# Patient Record
Sex: FEMALE | Race: WHITE | NOT HISPANIC OR LATINO | Employment: OTHER | URBAN - METROPOLITAN AREA
[De-identification: names, ages, dates, MRNs, and addresses within clinical notes are randomized per-mention and may not be internally consistent; named-entity substitution may affect disease eponyms.]

---

## 2018-05-09 ENCOUNTER — APPOINTMENT (OUTPATIENT)
Dept: RADIOLOGY | Facility: IMAGING CENTER | Age: 78
End: 2018-05-09
Attending: NURSE PRACTITIONER
Payer: MEDICARE

## 2018-05-09 ENCOUNTER — OFFICE VISIT (OUTPATIENT)
Dept: URGENT CARE | Facility: CLINIC | Age: 78
End: 2018-05-09
Payer: MEDICARE

## 2018-05-09 VITALS
HEART RATE: 76 BPM | BODY MASS INDEX: 27.97 KG/M2 | RESPIRATION RATE: 16 BRPM | DIASTOLIC BLOOD PRESSURE: 70 MMHG | TEMPERATURE: 97.6 F | SYSTOLIC BLOOD PRESSURE: 120 MMHG | HEIGHT: 62 IN | WEIGHT: 152 LBS | OXYGEN SATURATION: 97 %

## 2018-05-09 DIAGNOSIS — R05.9 COUGH: ICD-10-CM

## 2018-05-09 DIAGNOSIS — J22 LRTI (LOWER RESPIRATORY TRACT INFECTION): ICD-10-CM

## 2018-05-09 PROCEDURE — 99214 OFFICE O/P EST MOD 30 MIN: CPT | Performed by: NURSE PRACTITIONER

## 2018-05-09 PROCEDURE — 71046 X-RAY EXAM CHEST 2 VIEWS: CPT | Mod: TC,FY | Performed by: NURSE PRACTITIONER

## 2018-05-09 RX ORDER — ALBUTEROL SULFATE 90 UG/1
2 AEROSOL, METERED RESPIRATORY (INHALATION) EVERY 6 HOURS PRN
Qty: 1 INHALER | Refills: 0 | Status: SHIPPED | OUTPATIENT
Start: 2018-05-09 | End: 2020-06-24

## 2018-05-09 RX ORDER — DOXYCYCLINE HYCLATE 100 MG
100 TABLET ORAL 2 TIMES DAILY
Qty: 14 TAB | Refills: 0 | Status: SHIPPED | OUTPATIENT
Start: 2018-05-09 | End: 2018-05-16

## 2018-05-09 RX ORDER — PREDNISONE 10 MG/1
40 TABLET ORAL DAILY
Qty: 20 TAB | Refills: 0 | Status: SHIPPED | OUTPATIENT
Start: 2018-05-09 | End: 2018-05-14

## 2018-05-09 ASSESSMENT — ENCOUNTER SYMPTOMS
SHORTNESS OF BREATH: 1
SORE THROAT: 0
MYALGIAS: 1
COUGH: 1
FEVER: 1
VOMITING: 0
SPUTUM PRODUCTION: 1
WHEEZING: 0
NAUSEA: 0
EYE PAIN: 0
DIZZINESS: 0
CHILLS: 1

## 2018-05-09 NOTE — PROGRESS NOTES
Subjective:     Tayla Molina is a 78 y.o. female who presents for Cough (Over 3 wks stuffy nose , couple days dry cough and chest congestion .)  Patient presents to clinic today with complaints of a productive cough, malaise, body aches, fever ×3 weeks. Patient states she is feeling short of breath. Patient feels symptoms are worsening and not  improving. Patient does have a history of pneumonia.     Cough   This is a new problem. Episode onset: 3 weeks. The problem has been unchanged. The problem occurs constantly. The cough is productive of sputum. Associated symptoms include chills, a fever, myalgias and shortness of breath. Pertinent negatives include no chest pain, nasal congestion, postnasal drip, rash, sore throat or wheezing. Nothing aggravates the symptoms. She has tried OTC cough suppressant for the symptoms. The treatment provided no relief. Her past medical history is significant for pneumonia.     Past Medical History:   Diagnosis Date   • Hypothyroidism    No past surgical history on file.  Social History     Social History   • Marital status:      Spouse name: N/A   • Number of children: N/A   • Years of education: N/A     Occupational History   • Not on file.     Social History Main Topics   • Smoking status: Never Smoker   • Smokeless tobacco: Never Used   • Alcohol use Not on file   • Drug use: Unknown   • Sexual activity: Not on file     Other Topics Concern   • Not on file     Social History Narrative   • No narrative on file    No family history on file. Review of Systems   Constitutional: Positive for chills, fever and malaise/fatigue.   HENT: Negative for postnasal drip and sore throat.    Eyes: Negative for pain.   Respiratory: Positive for cough, sputum production and shortness of breath. Negative for wheezing.    Cardiovascular: Negative for chest pain.   Gastrointestinal: Negative for nausea and vomiting.   Genitourinary: Negative for hematuria.   Musculoskeletal: Positive for  "myalgias.   Skin: Negative for rash.   Neurological: Negative for dizziness.     Allergies   Allergen Reactions   • Codeine Vomiting     Itching      Objective:   /70   Pulse 76   Temp 36.4 °C (97.6 °F)   Resp 16   Ht 1.575 m (5' 2\")   Wt 68.9 kg (152 lb)   SpO2 97%   BMI 27.80 kg/m²   Physical Exam   Constitutional: She is oriented to person, place, and time. She appears well-developed and well-nourished. No distress.   HENT:   Head: Normocephalic and atraumatic.   Right Ear: Tympanic membrane normal.   Left Ear: Tympanic membrane normal.   Nose: Nose normal. Right sinus exhibits no maxillary sinus tenderness and no frontal sinus tenderness. Left sinus exhibits no maxillary sinus tenderness and no frontal sinus tenderness.   Mouth/Throat: Uvula is midline, oropharynx is clear and moist and mucous membranes are normal. No posterior oropharyngeal edema, posterior oropharyngeal erythema or tonsillar abscesses. No tonsillar exudate.   Eyes: Conjunctivae and EOM are normal. Pupils are equal, round, and reactive to light. Right eye exhibits no discharge. Left eye exhibits no discharge.   Cardiovascular: Normal rate and regular rhythm.    No murmur heard.  Pulmonary/Chest: Effort normal. No respiratory distress. She has decreased breath sounds. She has wheezes. She has no rhonchi. She has no rales.   Abdominal: Soft. She exhibits no distension. There is no tenderness.   Neurological: She is alert and oriented to person, place, and time. She has normal reflexes. No sensory deficit.   Skin: Skin is warm, dry and intact.   Psychiatric: She has a normal mood and affect.         Assessment/Plan:   Assessment    1. Cough  DX-CHEST-2 VIEWS    doxycycline (VIBRAMYCIN) 100 MG Tab    predniSONE (DELTASONE) 10 MG Tab    albuterol 108 (90 Base) MCG/ACT Aero Soln inhalation aerosol   2. LRTI (lower respiratory tract infection)  doxycycline (VIBRAMYCIN) 100 MG Tab    predniSONE (DELTASONE) 10 MG Tab    albuterol 108 (90 " Base) MCG/ACT Aero Soln inhalation aerosol     Xray results  HEART: Not enlarged. There is atherosclerotic calcification in the aortic arch.  LUNGS: No areas of air space disease are demonstrated.  PLEURA: No effusion or pneumothorax.  There are surgical clips projecting over the gastroesophageal junction.    Patient with a productive cough, fever ×3 weeks will treat for suspected bacterial infection. Advised to continue supportive care with Tylenol and/or ibuprofen for fevers and discomfort. Increased fluids and electrolytes.      Patient given precautionary s/sx that mandate immediate follow up and evaluation in the ED. Advised of risks of not doing so.    DDX, Supportive care, and indications for immediate follow-up discussed with patient.    Instructed to return to clinic or nearest emergency department if we are not available for any change in condition, further concerns, or worsening of symptoms.    The patient demonstrated a good understanding and agreed with the treatment plan.

## 2019-10-18 ENCOUNTER — OFFICE VISIT (OUTPATIENT)
Dept: URGENT CARE | Facility: CLINIC | Age: 79
End: 2019-10-18
Payer: MEDICARE

## 2019-10-18 VITALS
OXYGEN SATURATION: 97 % | BODY MASS INDEX: 29.45 KG/M2 | TEMPERATURE: 98.2 F | RESPIRATION RATE: 20 BRPM | DIASTOLIC BLOOD PRESSURE: 60 MMHG | WEIGHT: 156 LBS | HEIGHT: 61 IN | HEART RATE: 76 BPM | SYSTOLIC BLOOD PRESSURE: 116 MMHG

## 2019-10-18 DIAGNOSIS — L20.9 ATOPIC DERMATITIS, UNSPECIFIED TYPE: ICD-10-CM

## 2019-10-18 PROCEDURE — 99214 OFFICE O/P EST MOD 30 MIN: CPT | Performed by: PHYSICIAN ASSISTANT

## 2019-10-18 RX ORDER — ATORVASTATIN CALCIUM 20 MG/1
20 TABLET, FILM COATED ORAL DAILY
COMMUNITY
Start: 2019-09-07 | End: 2020-07-13

## 2019-10-18 RX ORDER — CLOPIDOGREL BISULFATE 75 MG/1
75 TABLET ORAL DAILY
COMMUNITY
Start: 2019-10-07 | End: 2020-07-13

## 2019-10-18 RX ORDER — PHENOL 1.4 %
AEROSOL, SPRAY (ML) MUCOUS MEMBRANE
COMMUNITY
End: 2020-06-24

## 2019-10-18 RX ORDER — ASPIRIN 81 MG/1
81 TABLET, CHEWABLE ORAL DAILY
COMMUNITY
End: 2020-06-24

## 2019-10-18 RX ORDER — LEFLUNOMIDE 20 MG/1
20 TABLET ORAL
Refills: 2 | COMMUNITY
Start: 2019-08-27 | End: 2020-02-20

## 2019-10-18 RX ORDER — TRIAMCINOLONE ACETONIDE 5 MG/G
CREAM TOPICAL
Qty: 15 G | Refills: 0 | Status: SHIPPED | OUTPATIENT
Start: 2019-10-18 | End: 2020-06-24

## 2019-10-18 RX ORDER — FAMCICLOVIR 250 MG/1
250 TABLET ORAL 2 TIMES DAILY
COMMUNITY
End: 2020-06-24

## 2019-10-18 ASSESSMENT — ENCOUNTER SYMPTOMS
FEVER: 0
CHILLS: 0

## 2019-10-18 ASSESSMENT — PATIENT HEALTH QUESTIONNAIRE - PHQ9: CLINICAL INTERPRETATION OF PHQ2 SCORE: 0

## 2019-10-18 NOTE — PROGRESS NOTES
"Subjective:   Tayla Molina is a 79 y.o. female who presents for Rash (under bilateral armpits, on her back, burning, itchy, patient believes it's shingles due to leflunomide x 2 weeks )    This is a new problem.  Patient presents to urgent care with concern for possible shingles.  Patient reports itchy rash to the center of her back present for the past 2 weeks.  Today, while dressing, she noted bilateral axilla appeared red as well.  Patient is concerned because she has recently started leflunomide along with her Xeljanz for rheumatoid arthritis.  She is aware that this medication has a potential side effect of immune suppression and shingles and is concerned.  Patient denies any fever or chills.  Denies any new lotions, creams, soaps or detergents.        Rash   Pertinent negatives include no fever.     Review of Systems   Constitutional: Negative for chills, fever and malaise/fatigue.   Skin: Positive for itching and rash.   All other systems reviewed and are negative.    Allergies   Allergen Reactions   • Codeine Vomiting     Itching        Objective:   /60 (BP Location: Right arm, Patient Position: Sitting, BP Cuff Size: Adult)   Pulse 76   Temp 36.8 °C (98.2 °F) (Temporal)   Resp 20   Ht 1.549 m (5' 1\")   Wt 70.8 kg (156 lb)   SpO2 97%   BMI 29.48 kg/m²   Physical Exam   Constitutional: She is oriented to person, place, and time. She appears well-developed and well-nourished. She does not appear ill. No distress.   HENT:   Head: Normocephalic and atraumatic.   Right Ear: External ear normal.   Left Ear: External ear normal.   Nose: Nose normal.   Mouth/Throat: Uvula is midline, oropharynx is clear and moist and mucous membranes are normal. No oropharyngeal exudate.   Eyes: Pupils are equal, round, and reactive to light. Conjunctivae and EOM are normal.   Neck: Normal range of motion. Neck supple.   Cardiovascular: Normal rate, regular rhythm and normal heart sounds. Exam reveals no gallop and " no friction rub.   No murmur heard.  Pulmonary/Chest: Effort normal and breath sounds normal. No respiratory distress. She has no wheezes. She has no rales.   Abdominal: Soft. Bowel sounds are normal. She exhibits no distension and no mass. There is no tenderness. There is no rebound and no guarding.   Musculoskeletal: Normal range of motion. She exhibits no edema, tenderness or deformity.   Lymphadenopathy:        Head (right side): No submental, no submandibular and no tonsillar adenopathy present.        Head (left side): No submental, no submandibular and no tonsillar adenopathy present.     She has no cervical adenopathy.        Right: No supraclavicular adenopathy present.        Left: No supraclavicular adenopathy present.   Neurological: She is alert and oriented to person, place, and time. She has normal strength. No cranial nerve deficit or sensory deficit. Coordination normal.   Skin: Skin is warm and dry. Rash noted. Rash is maculopapular.        Center of back along thoracic spine with 3 or 4 small patches of dry scaly skin on mildly erythematous base.  No vesicular lesions noted.   Psychiatric: She has a normal mood and affect. Judgment normal.   Vitals reviewed.          Assessment/Plan:   Assessment    1. Atopic dermatitis, unspecified type  - triamcinolone acetonide (KENALOG) 0.5 % Cream; Apply small amount to affected area tid PRN itching  Dispense: 15 g; Refill: 0    Reassurance provided.  Patient is given a prescription for triamcinolone to use as needed.  Recommend moisturizer.  Observe closely.    Differential diagnosis, natural history, supportive care, and indications for immediate follow-up discussed.    Red flag warning symptoms and strict ER/follow-up precautions given.  The patient demonstrated a good understanding and agreed with the treatment plan.  Please note that this note was created using voice recognition speech to text software. Every effort has been made to correct obvious  errors.  However, I expect there are errors of grammar and possibly context that were not discovered prior to finalizing the note  SJanie Jaimes PA-C

## 2020-02-20 ENCOUNTER — OFFICE VISIT (OUTPATIENT)
Dept: URGENT CARE | Facility: CLINIC | Age: 80
End: 2020-02-20
Payer: MEDICARE

## 2020-02-20 ENCOUNTER — APPOINTMENT (OUTPATIENT)
Dept: RADIOLOGY | Facility: IMAGING CENTER | Age: 80
End: 2020-02-20
Attending: NURSE PRACTITIONER
Payer: MEDICARE

## 2020-02-20 VITALS
OXYGEN SATURATION: 95 % | TEMPERATURE: 99 F | HEIGHT: 61 IN | WEIGHT: 150 LBS | SYSTOLIC BLOOD PRESSURE: 116 MMHG | BODY MASS INDEX: 28.32 KG/M2 | DIASTOLIC BLOOD PRESSURE: 54 MMHG | RESPIRATION RATE: 18 BRPM | HEART RATE: 82 BPM

## 2020-02-20 DIAGNOSIS — Z87.01 HISTORY OF PNEUMONIA: ICD-10-CM

## 2020-02-20 DIAGNOSIS — R06.2 WHEEZING: ICD-10-CM

## 2020-02-20 DIAGNOSIS — J22 LOWER RESPIRATORY INFECTION: ICD-10-CM

## 2020-02-20 DIAGNOSIS — R42 DIZZINESS: ICD-10-CM

## 2020-02-20 LAB — EKG 4674: NORMAL

## 2020-02-20 PROCEDURE — 94640 AIRWAY INHALATION TREATMENT: CPT | Performed by: NURSE PRACTITIONER

## 2020-02-20 PROCEDURE — 99214 OFFICE O/P EST MOD 30 MIN: CPT | Mod: 25 | Performed by: NURSE PRACTITIONER

## 2020-02-20 PROCEDURE — 71046 X-RAY EXAM CHEST 2 VIEWS: CPT | Mod: TC,FY | Performed by: NURSE PRACTITIONER

## 2020-02-20 RX ORDER — BENZONATATE 100 MG/1
100 CAPSULE ORAL 3 TIMES DAILY PRN
Qty: 30 CAP | Refills: 0 | Status: SHIPPED | OUTPATIENT
Start: 2020-02-20 | End: 2020-06-24

## 2020-02-20 RX ORDER — ALBUTEROL SULFATE 90 UG/1
1-2 AEROSOL, METERED RESPIRATORY (INHALATION) EVERY 4 HOURS PRN
Qty: 1 INHALER | Refills: 0 | Status: SHIPPED | OUTPATIENT
Start: 2020-02-20 | End: 2020-06-24

## 2020-02-20 RX ORDER — IPRATROPIUM BROMIDE AND ALBUTEROL SULFATE 2.5; .5 MG/3ML; MG/3ML
3 SOLUTION RESPIRATORY (INHALATION) ONCE
Status: COMPLETED | OUTPATIENT
Start: 2020-02-20 | End: 2020-02-20

## 2020-02-20 RX ORDER — DOXYCYCLINE HYCLATE 100 MG
100 TABLET ORAL 2 TIMES DAILY
Qty: 14 TAB | Refills: 0 | Status: SHIPPED | OUTPATIENT
Start: 2020-02-20 | End: 2020-02-27

## 2020-02-20 RX ADMIN — IPRATROPIUM BROMIDE AND ALBUTEROL SULFATE 3 ML: 2.5; .5 SOLUTION RESPIRATORY (INHALATION) at 11:40

## 2020-02-20 ASSESSMENT — ENCOUNTER SYMPTOMS
ABDOMINAL PAIN: 0
EYE REDNESS: 0
MYALGIAS: 1
SHORTNESS OF BREATH: 1
COUGH: 1
PALPITATIONS: 0
WHEEZING: 0
NAUSEA: 0
HEADACHES: 0
CHILLS: 1
VOMITING: 0
EYE DISCHARGE: 0
FEVER: 0
DIARRHEA: 0
SPUTUM PRODUCTION: 1
STRIDOR: 0
SORE THROAT: 1
DIZZINESS: 1

## 2020-02-20 NOTE — PROGRESS NOTES
"Subjective:   Tayla Molina is a 80 y.o. female who presents for Cough (x5 days, productive); Sore Throat; Body Aches; and Shortness of Breath        Cough   This is a new problem. The current episode started in the past 7 days. The problem has been gradually worsening. The problem occurs every few minutes. The cough is productive of sputum. Associated symptoms include chills, myalgias, nasal congestion, postnasal drip, a sore throat and shortness of breath. Pertinent negatives include no chest pain, ear pain, eye redness, fever (subjective), headaches, rash or wheezing. Treatments tried: NyQuil. The treatment provided mild relief. Her past medical history is significant for asthma and pneumonia (Multiple times).      Accompanied by daughter    Review of Systems   Constitutional: Positive for chills. Negative for fever (subjective).   HENT: Positive for congestion, postnasal drip and sore throat. Negative for ear discharge and ear pain.    Eyes: Negative for discharge and redness.   Respiratory: Positive for cough, sputum production and shortness of breath. Negative for wheezing and stridor.    Cardiovascular: Negative for chest pain and palpitations.   Gastrointestinal: Negative for abdominal pain, diarrhea, nausea and vomiting.   Musculoskeletal: Positive for myalgias.   Skin: Negative for itching and rash.   Neurological: Positive for dizziness. Negative for headaches.   All other systems reviewed and are negative.    PMH:  has a past medical history of Hypothyroidism.  ALLERGIES:   Allergies   Allergen Reactions   • Codeine Vomiting     Itching       Patient's PMH, SocHx, SurgHx, FamHx, Drug allergies and medications reviewed.     Objective:   /54 (BP Location: Left arm, Patient Position: Sitting, BP Cuff Size: Adult)   Pulse 82   Temp 37.2 °C (99 °F) (Temporal)   Resp 18   Ht 1.549 m (5' 1\")   Wt 68 kg (150 lb)   SpO2 95%   BMI 28.34 kg/m²   Physical Exam  Vitals signs reviewed.   Constitutional: "       Appearance: She is well-developed.   HENT:      Head: Normocephalic.      Right Ear: Tympanic membrane and ear canal normal. No middle ear effusion. Tympanic membrane is not perforated or erythematous.      Left Ear: Tympanic membrane and ear canal normal.  No middle ear effusion. Tympanic membrane is not perforated or erythematous.      Nose: Nose normal. No rhinorrhea.      Right Sinus: No maxillary sinus tenderness or frontal sinus tenderness.      Left Sinus: No maxillary sinus tenderness or frontal sinus tenderness.      Mouth/Throat:      Lips: Pink.      Mouth: Mucous membranes are moist.      Pharynx: Oropharynx is clear. Uvula midline. No oropharyngeal exudate, posterior oropharyngeal erythema or uvula swelling.      Tonsils: No tonsillar exudate.   Eyes:      General: Lids are normal.      Extraocular Movements: Extraocular movements intact.      Conjunctiva/sclera: Conjunctivae normal.      Pupils: Pupils are equal, round, and reactive to light.   Neck:      Musculoskeletal: Normal range of motion.      Thyroid: No thyromegaly.   Cardiovascular:      Rate and Rhythm: Normal rate and regular rhythm.      Heart sounds: Normal heart sounds.   Pulmonary:      Effort: Pulmonary effort is normal. No tachypnea, bradypnea, accessory muscle usage, prolonged expiration or respiratory distress.      Breath sounds: Examination of the right-upper field reveals wheezing. Examination of the right-lower field reveals wheezing. Wheezing present.   Lymphadenopathy:      Head:      Right side of head: No submandibular or tonsillar adenopathy.      Left side of head: No submandibular or tonsillar adenopathy.   Skin:     General: Skin is warm and dry.      Findings: No rash.   Neurological:      Mental Status: She is alert and oriented to person, place, and time.   Psychiatric:         Mood and Affect: Mood normal.         Speech: Speech normal.         Behavior: Behavior normal. Behavior is cooperative.         Thought  Content: Thought content normal.         Judgment: Judgment normal.           Assessment/Plan:   Assessment    1. Dizziness  EKG   2. Wheezing  DX-CHEST-2 VIEWS    ipratropium-albuterol (DUONEB) nebulizer solution    albuterol 108 (90 Base) MCG/ACT Aero Soln inhalation aerosol   3. History of pneumonia  DX-CHEST-2 VIEWS   4. Lower respiratory infection  doxycycline (VIBRAMYCIN) 100 MG Tab    benzonatate (TESSALON) 100 MG Cap     EKG to rule out acute cardiac issues. EKG NSR without any acute changes to ST segment or T wave changes.  Chest xray negative  Recommended OTC Coricidin  Patient encouraged to increase clear liquid intake  Discussed signs/symptoms of worsening infection and when to return to the office.    Differential diagnosis, natural history, supportive care, and indications for immediate follow-up discussed.     **Please note that all invasive procedures during this visit were performed by myself and/or the Medical Assistant under the supervision of the PA or MD in office**

## 2020-06-24 ENCOUNTER — HOSPITAL ENCOUNTER (OUTPATIENT)
Facility: MEDICAL CENTER | Age: 80
End: 2020-06-25
Attending: EMERGENCY MEDICINE | Admitting: HOSPITALIST
Payer: MEDICARE

## 2020-06-24 ENCOUNTER — APPOINTMENT (OUTPATIENT)
Dept: RADIOLOGY | Facility: MEDICAL CENTER | Age: 80
End: 2020-06-24
Attending: EMERGENCY MEDICINE
Payer: MEDICARE

## 2020-06-24 DIAGNOSIS — I63.9 CEREBROVASCULAR ACCIDENT (CVA), UNSPECIFIED MECHANISM (HCC): ICD-10-CM

## 2020-06-24 DIAGNOSIS — R07.9 CHEST PAIN, UNSPECIFIED TYPE: ICD-10-CM

## 2020-06-24 PROBLEM — E03.9 HYPOTHYROID: Status: ACTIVE | Noted: 2020-06-24

## 2020-06-24 LAB
ABO + RH BLD: NORMAL
ABO GROUP BLD: NORMAL
ALBUMIN SERPL BCP-MCNC: 4 G/DL (ref 3.2–4.9)
ALBUMIN/GLOB SERPL: 1.7 G/DL
ALP SERPL-CCNC: 58 U/L (ref 30–99)
ALT SERPL-CCNC: 22 U/L (ref 2–50)
ANION GAP SERPL CALC-SCNC: 9 MMOL/L (ref 7–16)
APTT PPP: 29 SEC (ref 24.7–36)
AST SERPL-CCNC: 27 U/L (ref 12–45)
BASOPHILS # BLD AUTO: 0.3 % (ref 0–1.8)
BASOPHILS # BLD: 0.02 K/UL (ref 0–0.12)
BILIRUB SERPL-MCNC: 0.2 MG/DL (ref 0.1–1.5)
BLD GP AB SCN SERPL QL: NORMAL
BUN SERPL-MCNC: 16 MG/DL (ref 8–22)
CALCIUM SERPL-MCNC: 9.3 MG/DL (ref 8.4–10.2)
CFT BLD TEG: 3.5 MIN (ref 5–10)
CHLORIDE SERPL-SCNC: 108 MMOL/L (ref 96–112)
CLOT ANGLE BLD TEG: 70.9 DEGREES (ref 53–72)
CLOT LYSIS 30M P MA LENFR BLD TEG: 0 % (ref 0–8)
CO2 SERPL-SCNC: 23 MMOL/L (ref 20–33)
CREAT SERPL-MCNC: 0.8 MG/DL (ref 0.5–1.4)
CT.EXTRINSIC BLD ROTEM: 1.4 MIN (ref 1–3)
EKG IMPRESSION: NORMAL
EOSINOPHIL # BLD AUTO: 0.1 K/UL (ref 0–0.51)
EOSINOPHIL NFR BLD: 1.6 % (ref 0–6.9)
ERYTHROCYTE [DISTWIDTH] IN BLOOD BY AUTOMATED COUNT: 46.1 FL (ref 35.9–50)
EST. AVERAGE GLUCOSE BLD GHB EST-MCNC: 97 MG/DL
GLOBULIN SER CALC-MCNC: 2.4 G/DL (ref 1.9–3.5)
GLUCOSE BLD-MCNC: 95 MG/DL (ref 65–99)
GLUCOSE SERPL-MCNC: 91 MG/DL (ref 65–99)
HBA1C MFR BLD: 5 % (ref 0–5.6)
HCT VFR BLD AUTO: 41.1 % (ref 37–47)
HGB BLD-MCNC: 13.4 G/DL (ref 12–16)
IMM GRANULOCYTES # BLD AUTO: 0.02 K/UL (ref 0–0.11)
IMM GRANULOCYTES NFR BLD AUTO: 0.3 % (ref 0–0.9)
INR PPP: 0.91 (ref 0.87–1.13)
LYMPHOCYTES # BLD AUTO: 1.51 K/UL (ref 1–4.8)
LYMPHOCYTES NFR BLD: 24.1 % (ref 22–41)
MCF BLD TEG: 68.1 MM (ref 50–70)
MCH RBC QN AUTO: 32.2 PG (ref 27–33)
MCHC RBC AUTO-ENTMCNC: 32.6 G/DL (ref 33.6–35)
MCV RBC AUTO: 98.8 FL (ref 81.4–97.8)
MONOCYTES # BLD AUTO: 0.46 K/UL (ref 0–0.85)
MONOCYTES NFR BLD AUTO: 7.3 % (ref 0–13.4)
NEUTROPHILS # BLD AUTO: 4.15 K/UL (ref 2–7.15)
NEUTROPHILS NFR BLD: 66.4 % (ref 44–72)
NRBC # BLD AUTO: 0 K/UL
NRBC BLD-RTO: 0 /100 WBC
PLATELET # BLD AUTO: 238 K/UL (ref 164–446)
PMV BLD AUTO: 9.8 FL (ref 9–12.9)
POTASSIUM SERPL-SCNC: 3.7 MMOL/L (ref 3.6–5.5)
PROT SERPL-MCNC: 6.4 G/DL (ref 6–8.2)
PROTHROMBIN TIME: 12.3 SEC (ref 12–14.6)
RBC # BLD AUTO: 4.16 M/UL (ref 4.2–5.4)
RH BLD: NORMAL
SODIUM SERPL-SCNC: 140 MMOL/L (ref 135–145)
TEG ALGORITHM TGALG: ABNORMAL
TROPONIN T SERPL-MCNC: <6 NG/L (ref 6–19)
WBC # BLD AUTO: 6.3 K/UL (ref 4.8–10.8)

## 2020-06-24 PROCEDURE — 71045 X-RAY EXAM CHEST 1 VIEW: CPT

## 2020-06-24 PROCEDURE — 86901 BLOOD TYPING SEROLOGIC RH(D): CPT

## 2020-06-24 PROCEDURE — 700102 HCHG RX REV CODE 250 W/ 637 OVERRIDE(OP): Performed by: HOSPITALIST

## 2020-06-24 PROCEDURE — 86850 RBC ANTIBODY SCREEN: CPT

## 2020-06-24 PROCEDURE — G0378 HOSPITAL OBSERVATION PER HR: HCPCS

## 2020-06-24 PROCEDURE — 36415 COLL VENOUS BLD VENIPUNCTURE: CPT

## 2020-06-24 PROCEDURE — 84484 ASSAY OF TROPONIN QUANT: CPT

## 2020-06-24 PROCEDURE — 85384 FIBRINOGEN ACTIVITY: CPT

## 2020-06-24 PROCEDURE — 0042T CT-CEREBRAL PERFUSION ANALYSIS: CPT

## 2020-06-24 PROCEDURE — 700117 HCHG RX CONTRAST REV CODE 255: Performed by: EMERGENCY MEDICINE

## 2020-06-24 PROCEDURE — 99285 EMERGENCY DEPT VISIT HI MDM: CPT

## 2020-06-24 PROCEDURE — A9270 NON-COVERED ITEM OR SERVICE: HCPCS | Performed by: HOSPITALIST

## 2020-06-24 PROCEDURE — 86900 BLOOD TYPING SEROLOGIC ABO: CPT

## 2020-06-24 PROCEDURE — 82962 GLUCOSE BLOOD TEST: CPT

## 2020-06-24 PROCEDURE — 83036 HEMOGLOBIN GLYCOSYLATED A1C: CPT

## 2020-06-24 PROCEDURE — 70496 CT ANGIOGRAPHY HEAD: CPT

## 2020-06-24 PROCEDURE — 85025 COMPLETE CBC W/AUTO DIFF WBC: CPT

## 2020-06-24 PROCEDURE — 93005 ELECTROCARDIOGRAM TRACING: CPT | Performed by: EMERGENCY MEDICINE

## 2020-06-24 PROCEDURE — 70450 CT HEAD/BRAIN W/O DYE: CPT

## 2020-06-24 PROCEDURE — 99220 PR INITIAL OBSERVATION CARE,LEVL III: CPT | Performed by: HOSPITALIST

## 2020-06-24 PROCEDURE — 80053 COMPREHEN METABOLIC PANEL: CPT

## 2020-06-24 PROCEDURE — 85576 BLOOD PLATELET AGGREGATION: CPT

## 2020-06-24 PROCEDURE — 85730 THROMBOPLASTIN TIME PARTIAL: CPT

## 2020-06-24 PROCEDURE — 70498 CT ANGIOGRAPHY NECK: CPT

## 2020-06-24 PROCEDURE — 85610 PROTHROMBIN TIME: CPT

## 2020-06-24 PROCEDURE — 85347 COAGULATION TIME ACTIVATED: CPT

## 2020-06-24 RX ORDER — BISACODYL 10 MG
10 SUPPOSITORY, RECTAL RECTAL
Status: CANCELLED | OUTPATIENT
Start: 2020-06-24

## 2020-06-24 RX ORDER — CLOPIDOGREL BISULFATE 75 MG/1
75 TABLET ORAL DAILY
Status: DISCONTINUED | OUTPATIENT
Start: 2020-06-24 | End: 2020-06-25 | Stop reason: HOSPADM

## 2020-06-24 RX ORDER — BISACODYL 10 MG
10 SUPPOSITORY, RECTAL RECTAL
Status: DISCONTINUED | OUTPATIENT
Start: 2020-06-24 | End: 2020-06-25 | Stop reason: HOSPADM

## 2020-06-24 RX ORDER — LEVOTHYROXINE SODIUM 0.05 MG/1
100 TABLET ORAL
Status: DISCONTINUED | OUTPATIENT
Start: 2020-06-25 | End: 2020-06-25 | Stop reason: HOSPADM

## 2020-06-24 RX ORDER — POLYETHYLENE GLYCOL 3350 17 G/17G
1 POWDER, FOR SOLUTION ORAL
Status: DISCONTINUED | OUTPATIENT
Start: 2020-06-24 | End: 2020-06-25 | Stop reason: HOSPADM

## 2020-06-24 RX ORDER — ACETAMINOPHEN 325 MG/1
650 TABLET ORAL EVERY 6 HOURS PRN
Status: DISCONTINUED | OUTPATIENT
Start: 2020-06-24 | End: 2020-06-25 | Stop reason: HOSPADM

## 2020-06-24 RX ORDER — POLYETHYLENE GLYCOL 3350 17 G/17G
1 POWDER, FOR SOLUTION ORAL
Status: CANCELLED | OUTPATIENT
Start: 2020-06-24

## 2020-06-24 RX ORDER — ACETAMINOPHEN 325 MG/1
650 TABLET ORAL EVERY 6 HOURS PRN
Status: CANCELLED | OUTPATIENT
Start: 2020-06-24

## 2020-06-24 RX ORDER — TOPIRAMATE 25 MG/1
50 TABLET ORAL 2 TIMES DAILY
Status: DISCONTINUED | OUTPATIENT
Start: 2020-06-24 | End: 2020-06-25 | Stop reason: HOSPADM

## 2020-06-24 RX ORDER — AMOXICILLIN 250 MG
2 CAPSULE ORAL 2 TIMES DAILY
Status: CANCELLED | OUTPATIENT
Start: 2020-06-24

## 2020-06-24 RX ORDER — TOPIRAMATE 25 MG/1
50 TABLET ORAL 2 TIMES DAILY
Status: CANCELLED | OUTPATIENT
Start: 2020-06-25

## 2020-06-24 RX ORDER — ATORVASTATIN CALCIUM 40 MG/1
40 TABLET, FILM COATED ORAL DAILY
Status: DISCONTINUED | OUTPATIENT
Start: 2020-06-24 | End: 2020-06-25 | Stop reason: HOSPADM

## 2020-06-24 RX ORDER — CLOPIDOGREL BISULFATE 75 MG/1
75 TABLET ORAL DAILY
Status: CANCELLED | OUTPATIENT
Start: 2020-06-25

## 2020-06-24 RX ORDER — TRAZODONE HYDROCHLORIDE 50 MG/1
50 TABLET ORAL NIGHTLY
Status: DISCONTINUED | OUTPATIENT
Start: 2020-06-24 | End: 2020-06-25 | Stop reason: HOSPADM

## 2020-06-24 RX ORDER — TRAZODONE HYDROCHLORIDE 50 MG/1
50 TABLET ORAL NIGHTLY
Status: CANCELLED | OUTPATIENT
Start: 2020-06-24

## 2020-06-24 RX ORDER — ATORVASTATIN CALCIUM 40 MG/1
40 TABLET, FILM COATED ORAL DAILY
Status: CANCELLED | OUTPATIENT
Start: 2020-06-25

## 2020-06-24 RX ORDER — AMOXICILLIN 250 MG
2 CAPSULE ORAL 2 TIMES DAILY
Status: DISCONTINUED | OUTPATIENT
Start: 2020-06-24 | End: 2020-06-25 | Stop reason: HOSPADM

## 2020-06-24 RX ORDER — LEVOTHYROXINE SODIUM 0.05 MG/1
100 TABLET ORAL
Status: CANCELLED | OUTPATIENT
Start: 2020-06-25

## 2020-06-24 RX ORDER — CLOPIDOGREL BISULFATE 75 MG/1
75 TABLET ORAL DAILY
Status: DISCONTINUED | OUTPATIENT
Start: 2020-06-24 | End: 2020-06-24

## 2020-06-24 RX ADMIN — IOHEXOL 40 ML: 350 INJECTION, SOLUTION INTRAVENOUS at 13:37

## 2020-06-24 RX ADMIN — ACETAMINOPHEN 650 MG: 325 TABLET, FILM COATED ORAL at 20:03

## 2020-06-24 RX ADMIN — TRAZODONE HYDROCHLORIDE 50 MG: 50 TABLET ORAL at 22:02

## 2020-06-24 RX ADMIN — IOHEXOL 100 ML: 350 INJECTION, SOLUTION INTRAVENOUS at 13:36

## 2020-06-24 RX ADMIN — TOPIRAMATE 50 MG: 25 TABLET, FILM COATED ORAL at 19:56

## 2020-06-24 ASSESSMENT — ENCOUNTER SYMPTOMS
EYE PAIN: 0
SPEECH CHANGE: 0
HEMOPTYSIS: 0
BLOOD IN STOOL: 0
FOCAL WEAKNESS: 1
LOSS OF CONSCIOUSNESS: 0
MYALGIAS: 0
SPUTUM PRODUCTION: 0
EYE DISCHARGE: 0
SENSORY CHANGE: 1
SORE THROAT: 0
HALLUCINATIONS: 0
BRUISES/BLEEDS EASILY: 0
NERVOUS/ANXIOUS: 0
ABDOMINAL PAIN: 0
PALPITATIONS: 0
FLANK PAIN: 0
EYE REDNESS: 0
SHORTNESS OF BREATH: 0
FEVER: 0
STRIDOR: 0
SEIZURES: 0
CHILLS: 0
COUGH: 0
DIARRHEA: 0
VOMITING: 0

## 2020-06-24 ASSESSMENT — LIFESTYLE VARIABLES
EVER FELT BAD OR GUILTY ABOUT YOUR DRINKING: NO
ALCOHOL_USE: NO
EVER HAD A DRINK FIRST THING IN THE MORNING TO STEADY YOUR NERVES TO GET RID OF A HANGOVER: NO
HOW MANY TIMES IN THE PAST YEAR HAVE YOU HAD 5 OR MORE DRINKS IN A DAY: 0
HAVE PEOPLE ANNOYED YOU BY CRITICIZING YOUR DRINKING: NO
TOTAL SCORE: 0
TOTAL SCORE: 0
CONSUMPTION TOTAL: NEGATIVE
EVER_SMOKED: YES
HAVE YOU EVER FELT YOU SHOULD CUT DOWN ON YOUR DRINKING: NO
TOTAL SCORE: 0
ON A TYPICAL DAY WHEN YOU DRINK ALCOHOL HOW MANY DRINKS DO YOU HAVE: 0
AVERAGE NUMBER OF DAYS PER WEEK YOU HAVE A DRINK CONTAINING ALCOHOL: 0

## 2020-06-24 ASSESSMENT — COGNITIVE AND FUNCTIONAL STATUS - GENERAL
MOBILITY SCORE: 22
WALKING IN HOSPITAL ROOM: A LITTLE
DAILY ACTIVITIY SCORE: 24
SUGGESTED CMS G CODE MODIFIER DAILY ACTIVITY: CH
CLIMB 3 TO 5 STEPS WITH RAILING: A LITTLE
SUGGESTED CMS G CODE MODIFIER MOBILITY: CJ

## 2020-06-24 ASSESSMENT — PATIENT HEALTH QUESTIONNAIRE - PHQ9
6. FEELING BAD ABOUT YOURSELF - OR THAT YOU ARE A FAILURE OR HAVE LET YOURSELF OR YOUR FAMILY DOWN: SEVERAL DAYS
4. FEELING TIRED OR HAVING LITTLE ENERGY: MORE THAN HALF THE DAYS
7. TROUBLE CONCENTRATING ON THINGS, SUCH AS READING THE NEWSPAPER OR WATCHING TELEVISION: SEVERAL DAYS
SUM OF ALL RESPONSES TO PHQ9 QUESTIONS 1 AND 2: 2
8. MOVING OR SPEAKING SO SLOWLY THAT OTHER PEOPLE COULD HAVE NOTICED. OR THE OPPOSITE, BEING SO FIGETY OR RESTLESS THAT YOU HAVE BEEN MOVING AROUND A LOT MORE THAN USUAL: NOT AT ALL
3. TROUBLE FALLING OR STAYING ASLEEP OR SLEEPING TOO MUCH: SEVERAL DAYS
9. THOUGHTS THAT YOU WOULD BE BETTER OFF DEAD, OR OF HURTING YOURSELF: SEVERAL DAYS
2. FEELING DOWN, DEPRESSED, IRRITABLE, OR HOPELESS: SEVERAL DAYS
SUM OF ALL RESPONSES TO PHQ QUESTIONS 1-9: 10
5. POOR APPETITE OR OVEREATING: MORE THAN HALF THE DAYS
1. LITTLE INTEREST OR PLEASURE IN DOING THINGS: SEVERAL DAYS

## 2020-06-24 ASSESSMENT — FIBROSIS 4 INDEX: FIB4 SCORE: 1.89

## 2020-06-24 NOTE — ED TRIAGE NOTES
"Pt states this morning she woke up at 0530 with head pain at the base of her neck. She also states has numbness to L side of face, L arm and L leg. Also states has weakness. Pt has hx of 2 strokes and is on plavix and aspirin. Denies falling or hitting head. The last couple of days per daughter pt has been \"off.\"  "

## 2020-06-24 NOTE — DISCHARGE SUMMARY
Discharge Summary    CHIEF COMPLAINT ON ADMISSION  Chief Complaint   Patient presents with   • Possible Stroke     Reason for Admission  Possible Stroke     Admission Date  6/24/2020    CODE STATUS  Full Code    HPI & HOSPITAL COURSE  80 y.o. female with past medical history of previous 2 strokes on aspirin and Plavix who presented 6/24/2020 with left-sided weakness, left-sided facial numbness and dizziness this morning.  Last known normal was the night of 6/23-24.  Patient denies having loss of consciousness seizures, abnormal jerking movement.  She denies falling and denies hitting her head.  She reports that her weakness is moderate and slightly improved.  However still not back to normal.  CT scan of the head shows no evidence of acute infarction or hemorrhage.    Patient will be transferred to [facility] for neuro consult and complete stroke work-up.    Discharge Date  6/24/2020     DISCHARGE DIAGNOSES  Principal Problem:    Left-sided weakness POA: Unknown  Active Problems:    History of stroke POA: Unknown    Hypothyroidism POA: Unknown    Hypothyroid POA: Yes  Resolved Problems:    * No resolved hospital problems. *    CONSULTATIONS  Neurology, Dr Jauregui      LABORATORY  Lab Results   Component Value Date    SODIUM 140 06/24/2020    POTASSIUM 3.7 06/24/2020    CHLORIDE 108 06/24/2020    CO2 23 06/24/2020    GLUCOSE 91 06/24/2020    BUN 16 06/24/2020    CREATININE 0.80 06/24/2020        Lab Results   Component Value Date    WBC 6.3 06/24/2020    HEMOGLOBIN 13.4 06/24/2020    HEMATOCRIT 41.1 06/24/2020    PLATELETCT 238 06/24/2020      Total time of the discharge process exceeds 37 minutes.

## 2020-06-24 NOTE — ASSESSMENT & PLAN NOTE
"-Admit to Neuro, with continuous cardiac monitoring  -CT, showed no evidence for acute infarction, or hemorrhage   -Echo with contrast ordered - LVEF approx 65%  -NPO, till screened by speech - cleared with SLP  -Aspiration, Fall, and seizure precautions    -Neuro checks   -Neurology consultant, \"no need for transfer\"   -Permissive hypertension per neurology recommendations  -Physical, occupational therapy evaluation ordered  -Speech therapy evaluation ordered  -Physiatry consult placed   -Resume home aspirin and Plavix per neurology recommendations at this point  "

## 2020-06-24 NOTE — CONSULTS
Phone consult with ER physician at [facility].  80-year-old female.  She with 2 strokes in the past with no visual side effects.  Woke up this morning with dizziness and left-sided deficits numbness and weakness.  Last known normal normal some night before.  Mild weakness per the ER physician.  CTA head and neck were unremarkable.  CT head was also unremarkable.  Patient takes aspirin and Plavix at home.  Recommendations follow    1.  MRI brain  2.  Permissive hypertension for the next 24 to 48 hours, keep below systolic 220.  3.  Continue aspirin and Plavix  4.  Consider TEG studies for Plavix response  5.  Telemetry monitoring and TTE with bubble  6.  High-dose statin for an LDL goal below 70  7.  PT/OT/speech therapy  8.  Glucose per primary recommendations normoglycemia  9.  Patient does not need anticoagulation at this time unless find a cause such as a flutter/A. fib, DVT, or PE.  If MRI brain shows a pattern of infarct that suggests cardioembolic phenomenon, at that time rec long-term monitoring such as with a loop recorder or patch     Available by Tiger text if any questions arise.    Nathaniel Jimenez MD

## 2020-06-24 NOTE — ED NOTES
Med rec updated and complete  Allergies reviewed, per daughter   Pts daughter had a list of medications, went over list of medications and returned list back to pts daughter and made a copy for pts chart.   Pts daughter reports no antibiotics in the last 2 weeks

## 2020-06-24 NOTE — ED PROVIDER NOTES
ED Provider Note    CHIEF COMPLAINT  Chief Complaint   Patient presents with   • Possible Stroke   Stroke symptoms.    HPI  Tayla Molina is a 80 y.o. female who presents with left-sided facial, arm, and leg weakness and numbness that started this morning when she woke up.  Patient also had some mild left-sided weakness yesterday as well.  Symptoms have been present since this morning when she woke up.  Patient had chest pain 2 days ago.  Patient denies fever or chills.  No cough or cold symptoms    REVIEW OF SYSTEMS  See HPI for further details.  No vomiting or diarrhea.  All other systems are negative.    PAST MEDICAL HISTORY  Past Medical History:   Diagnosis Date   • Hypothyroidism    • Stroke (HCC)        FAMILY HISTORY  History reviewed. No pertinent family history.    SOCIAL HISTORY  Social History     Socioeconomic History   • Marital status:      Spouse name: Not on file   • Number of children: Not on file   • Years of education: Not on file   • Highest education level: Not on file   Occupational History   • Not on file   Social Needs   • Financial resource strain: Not on file   • Food insecurity     Worry: Not on file     Inability: Not on file   • Transportation needs     Medical: Not on file     Non-medical: Not on file   Tobacco Use   • Smoking status: Never Smoker   • Smokeless tobacco: Never Used   Substance and Sexual Activity   • Alcohol use: Not Currently   • Drug use: Never   • Sexual activity: Not on file   Lifestyle   • Physical activity     Days per week: Not on file     Minutes per session: Not on file   • Stress: Not on file   Relationships   • Social connections     Talks on phone: Not on file     Gets together: Not on file     Attends Holiness service: Not on file     Active member of club or organization: Not on file     Attends meetings of clubs or organizations: Not on file     Relationship status: Not on file   • Intimate partner violence     Fear of current or ex partner: Not  on file     Emotionally abused: Not on file     Physically abused: Not on file     Forced sexual activity: Not on file   Other Topics Concern   • Not on file   Social History Narrative   • Not on file       SURGICAL HISTORY  History reviewed. No pertinent surgical history.    CURRENT MEDICATIONS  Home Medications     Reviewed by Kenneth Conde (Pharmacy Tech) on 06/24/20 at 1323  Med List Status: Complete   Medication Last Dose Status   aspirin EC (ECOTRIN) 81 MG Tablet Delayed Response 6/24/2020 Active   atorvastatin (LIPITOR) 20 MG Tab 6/24/2020 Active   B Complex Vitamins (B COMPLEX PO) 6/24/2020 Active   Biotin 5000 MCG Cap 6/24/2020 Active   Calcium Carbonate (CALCIUM 600 PO) 6/24/2020 Active   celecoxib (CELEBREX) 200 MG Cap 6/24/2020 Active   clopidogrel (PLAVIX) 75 MG Tab 6/24/2020 Active   levothyroxine (SYNTHROID) 100 MCG Tab 6/24/2020 Active   multivitamin (THERAGRAN) Tab 6/24/2020 Active   Tofacitinib Citrate (XELJANZ) 5 MG Tab 6/24/2020 Active   topiramate (TOPAMAX) 25 MG Tab 6/24/2020 Active   trazodone (DESYREL) 50 MG Tab 6/23/2020 Active   TURMERIC PO 6/24/2020 Active                ALLERGIES  Allergies   Allergen Reactions   • Codeine Vomiting     Itching       PHYSICAL EXAM  VITAL SIGNS: /66   Pulse 74   Temp 36.9 °C (98.5 °F) (Temporal)   Resp 18   Wt 68 kg (150 lb)   SpO2 97%   BMI 28.34 kg/m²   Constitutional: Well developed, Well nourished, No acute distress,   HENT: Cephalic atraumatic.  Oropharynx is moist  Eyes: Pupils equal reactive to light extraocular movements are intact  Neck: Normal range of motion, No tenderness, Supple, No stridor.  No carotid bruit  Cardiovascular: Normal heart rate, Normal rhythm, No murmurs, occasional ectopic beats  Thorax & Lungs: Normal breath sounds, No respiratory distress,    Abdomen: Bowel sounds normal, Soft, No tenderness, No masses,  Skin: Warm, Dry, No erythema, No rash.   Back: No tenderness, No CVA tenderness.   Extremities: No  edema, No tenderness, No cyanosis, No clubbing. Dorsalis pedis pulses 2+ equal bilaterally. Radial pulses 2+ equal bilaterally   Neurologic: Normal deep tendon reflexes.  Strength sensation showed mild decreased both upper extremity and lower extremity strength.  There is a minimal left facial droop.  Speech intact.   Psychiatric: Affect normal, Judgment normal, Mood normal.     EKG  Sinus rhythm at a rate of 80.  Normal P waves.  Normal QRS.  Normal R wave progression.  Normal ST segments.  Normal T waves.  Normal EKG    RADIOLOGY/PROCEDURES  DX-CHEST-PORTABLE (1 VIEW)   Final Result      Mild left basilar atelectasis.      CT-CTA NECK WITH & W/O-POST PROCESSING   Final Result      1.  Mild atherosclerotic narrowing of the carotid bulb/proximal internal carotid arteries bilaterally.   2.  No focal high-grade stenosis, dissection or occlusion of the cervical carotid or vertebral arteries.      CT-CTA HEAD WITH & W/O-POST PROCESS   Final Result      No intracranial aneurysm, focal stenosis or abrupt large vessel cut off.      CT-CEREBRAL PERFUSION ANALYSIS   Final Result      1.  Cerebral blood flow less than 30% likely representing completed infarct = 0 mL.      2.  T Max more than 6 seconds likely representing combination of completed infarct and ischemia = 0 mL.      3.  Mismatched volume likely representing ischemic brain/penumbra = None      4.  Please note that the cerebral perfusion was performed on the limited brain tissue around the basal ganglia region. Infarct/ischemia outside the CT perfusion sections can be missed in this study.      CT-HEAD W/O   Final Result      No acute intracranial abnormality.        Results for orders placed or performed during the hospital encounter of 06/24/20   CBC WITH DIFFERENTIAL   Result Value Ref Range    WBC 6.3 4.8 - 10.8 K/uL    RBC 4.16 (L) 4.20 - 5.40 M/uL    Hemoglobin 13.4 12.0 - 16.0 g/dL    Hematocrit 41.1 37.0 - 47.0 %    MCV 98.8 (H) 81.4 - 97.8 fL    MCH 32.2  27.0 - 33.0 pg    MCHC 32.6 (L) 33.6 - 35.0 g/dL    RDW 46.1 35.9 - 50.0 fL    Platelet Count 238 164 - 446 K/uL    MPV 9.8 9.0 - 12.9 fL    Neutrophils-Polys 66.40 44.00 - 72.00 %    Lymphocytes 24.10 22.00 - 41.00 %    Monocytes 7.30 0.00 - 13.40 %    Eosinophils 1.60 0.00 - 6.90 %    Basophils 0.30 0.00 - 1.80 %    Immature Granulocytes 0.30 0.00 - 0.90 %    Nucleated RBC 0.00 /100 WBC    Neutrophils (Absolute) 4.15 2.00 - 7.15 K/uL    Lymphs (Absolute) 1.51 1.00 - 4.80 K/uL    Monos (Absolute) 0.46 0.00 - 0.85 K/uL    Eos (Absolute) 0.10 0.00 - 0.51 K/uL    Baso (Absolute) 0.02 0.00 - 0.12 K/uL    Immature Granulocytes (abs) 0.02 0.00 - 0.11 K/uL    NRBC (Absolute) 0.00 K/uL   COMP METABOLIC PANEL   Result Value Ref Range    Sodium 140 135 - 145 mmol/L    Potassium 3.7 3.6 - 5.5 mmol/L    Chloride 108 96 - 112 mmol/L    Co2 23 20 - 33 mmol/L    Anion Gap 9.0 7.0 - 16.0    Glucose 91 65 - 99 mg/dL    Bun 16 8 - 22 mg/dL    Creatinine 0.80 0.50 - 1.40 mg/dL    Calcium 9.3 8.4 - 10.2 mg/dL    AST(SGOT) 27 12 - 45 U/L    ALT(SGPT) 22 2 - 50 U/L    Alkaline Phosphatase 58 30 - 99 U/L    Total Bilirubin 0.2 0.1 - 1.5 mg/dL    Albumin 4.0 3.2 - 4.9 g/dL    Total Protein 6.4 6.0 - 8.2 g/dL    Globulin 2.4 1.9 - 3.5 g/dL    A-G Ratio 1.7 g/dL   PROTHROMBIN TIME   Result Value Ref Range    PT 12.3 12.0 - 14.6 sec    INR 0.91 0.87 - 1.13   APTT   Result Value Ref Range    APTT 29.0 24.7 - 36.0 sec   COD (ADULT)   Result Value Ref Range    ABO Grouping Only A     Rh Grouping Only POS     Antibody Screen-Cod NEG    TROPONIN   Result Value Ref Range    Troponin T <6 6 - 19 ng/L   ABO Rh Confirm   Result Value Ref Range    ABO Rh Confirm A POS    ESTIMATED GFR   Result Value Ref Range    GFR If African American >60 >60 mL/min/1.73 m 2    GFR If Non African American >60 >60 mL/min/1.73 m 2   ACCU-CHEK GLUCOSE   Result Value Ref Range    Glucose - Accu-Ck 95 65 - 99 mg/dL   EKG (NOW)   Result Value Ref Range    Report        [facility]    Test Date:  2020  Pt Name:    ELBERT ASHLEY                Department: [facility]  MRN:        [id number]                      Room:       -ROOM 7  Gender:     Female                       Technician: DEEPTHI  :        [id number]                   Requested By:XIN BUSH  Order #:    [id number]                    Reading MD:    Measurements  Intervals                                Axis  Rate:       79                           P:          -39  AZ:         169                          QRS:        49  QRSD:       115                          T:          44  QT:         390  QTc:        448    Interpretive Statements  Sinus rhythm  Nonspecific intraventricular conduction delay  Abnormal inferior Q waves  No previous ECG available for comparison           COURSE & MEDICAL DECISION MAKING  Pertinent Labs & Imaging studies reviewed. (See chart for details)  Patient had an NIH stroke scale of 4 at the time of evaluation.  Patient woke up with the stroke and is well past the 3-hour time limit to be eligible for thrombolysis..  There is no sign of large vessel occlusion seen on CT scan.  Neurology was consulted at 2:30 PM.  Patient will be admitted to the hospital.  The neurologist recommended echo, MRI and carotid duplex imaging.  Patient was admitted in stable condition.  In terms of the chest pain the patient's EKG and troponin are negative.    FINAL IMPRESSION  1.  CVA  2.  Chest pain  3.          Electronically signed by: Xin Bush M.D., 2020 2:42 PM

## 2020-06-24 NOTE — ASSESSMENT & PLAN NOTE
-History of previous strokes on aspirin and Plavix, reported compliance.  -Patient reports LEFT sided weakness and numbness since last stroke

## 2020-06-24 NOTE — H&P
Hospital Medicine History & Physical Note    Date of Service  6/24/2020     Primary Care Physician  Phill De Jesus M.D.       Code Status  Full Code    Chief Complaint  Chief Complaint   Patient presents with   • Possible Stroke     History of Presenting Illness  80 y.o. female with past medical history of previous 2 strokes on aspirin and Plavix who presented 6/24/2020 with left-sided weakness, left-sided facial numbness and dizziness this morning.  Last known normal was the night of 6/23-24.  Patient denies having loss of consciousness seizures, abnormal jerking movement.  She denies falling and denies hitting her head.  She reports that her weakness is moderate and slightly improved.  However still not back to normal.  CT scan of the head shows no evidence of acute infarction or hemorrhage.      Review of Systems  Review of Systems   Constitutional: Positive for malaise/fatigue. Negative for chills and fever.   HENT: Negative for congestion and sore throat.    Eyes: Negative for pain, discharge and redness.   Respiratory: Negative for cough, hemoptysis, sputum production, shortness of breath and stridor.    Cardiovascular: Negative for chest pain, palpitations and leg swelling.   Gastrointestinal: Negative for abdominal pain, blood in stool, diarrhea and vomiting.   Genitourinary: Negative for flank pain, hematuria and urgency.   Musculoskeletal: Negative for myalgias.   Skin: Negative.    Neurological: Positive for sensory change (Left-sided facial numbness) and focal weakness (Left-sided upper and lower extremity). Negative for speech change, seizures and loss of consciousness.   Endo/Heme/Allergies: Does not bruise/bleed easily.   Psychiatric/Behavioral: Negative for hallucinations and suicidal ideas. The patient is not nervous/anxious.        Past Medical History   has a past medical history of Hypothyroidism and Stroke (HCC).    Surgical History   has a past surgical history that includes dental  extraction(s).     Family History  family history includes Hypertension in her father.     Social History   reports that she has never smoked. She has never used smokeless tobacco. She reports previous alcohol use. She reports that she does not use drugs.    Allergies  Allergies   Allergen Reactions   • Codeine Vomiting     Itching       Medications  Prior to Admission Medications   Prescriptions Last Dose Informant Patient Reported? Taking?   B Complex Vitamins (B COMPLEX PO) 6/24/2020 at 1100 Family Member Yes Yes   Sig: Take 1 Tab by mouth every day.   Biotin 5000 MCG Cap 6/24/2020 at 1100 Family Member Yes Yes   Sig: Take 5,000 mcg by mouth every day.   Calcium Carbonate (CALCIUM 600 PO) 6/24/2020 at 1100 Family Member Yes Yes   Sig: Take 600 mg by mouth every day.   TURMERIC PO 6/24/2020 at 1100 Family Member Yes No   Sig: Take 500 mg by mouth every day.   Tofacitinib Citrate (XELJANZ) 5 MG Tab 6/24/2020 at 1100 Family Member Yes No   Sig: Take 10 mg by mouth every day.   aspirin EC (ECOTRIN) 81 MG Tablet Delayed Response 6/24/2020 at 1100 Family Member Yes Yes   Sig: Take 81 mg by mouth every day.   atorvastatin (LIPITOR) 20 MG Tab 6/24/2020 at 1100 Family Member Yes No   Sig: Take 20 mg by mouth every day.   celecoxib (CELEBREX) 200 MG Cap 6/24/2020 at 1100 Family Member Yes No   Sig: Take 200 mg by mouth every 48 hours.   clopidogrel (PLAVIX) 75 MG Tab 6/24/2020 at 1100 Family Member Yes No   Sig: Take 75 mg by mouth every day.   levothyroxine (SYNTHROID) 100 MCG Tab 6/24/2020 at 1100 Family Member Yes No   Sig: Take 100 mcg by mouth Every morning on an empty stomach.   multivitamin (THERAGRAN) Tab 6/24/2020 at 1100 Family Member Yes Yes   Sig: Take 1 Tab by mouth every day.   topiramate (TOPAMAX) 25 MG Tab 6/24/2020 at 1100 Family Member Yes No   Sig: Take 50 mg by mouth 2 times a day.   trazodone (DESYREL) 50 MG Tab 6/23/2020 at 2200 Family Member Yes No   Sig: Take 50 mg by mouth every evening.       Facility-Administered Medications: None       Physical Exam  Temp:  [36.9 °C (98.5 °F)] 36.9 °C (98.5 °F)  Pulse:  [69-92] 76  Resp:  [16-18] 18  BP: (105-153)/(65-77) 127/71  SpO2:  [90 %-97 %] 94 %    Physical Exam  Constitutional:       General: She is not in acute distress.     Appearance: She is not toxic-appearing.   HENT:      Head: Normocephalic and atraumatic.      Right Ear: External ear normal.      Left Ear: External ear normal.      Nose: No congestion or rhinorrhea.      Mouth/Throat:      Mouth: Mucous membranes are moist.      Pharynx: No oropharyngeal exudate or posterior oropharyngeal erythema.   Eyes:      General: No scleral icterus.        Right eye: No discharge.         Left eye: No discharge.      Conjunctiva/sclera: Conjunctivae normal.      Pupils: Pupils are equal, round, and reactive to light.   Neck:      Musculoskeletal: Neck supple. No neck rigidity or muscular tenderness.   Cardiovascular:      Heart sounds: No friction rub. No gallop.    Pulmonary:      Breath sounds: No stridor. No wheezing or rhonchi.   Abdominal:      General: There is no distension.      Palpations: Abdomen is soft.      Tenderness: There is no abdominal tenderness. There is no guarding or rebound.   Musculoskeletal:         General: No swelling.      Right lower leg: No edema.      Left lower leg: No edema.   Skin:     Coloration: Skin is not jaundiced or pale.      Findings: No bruising or erythema.   Neurological:      Mental Status: She is alert and oriented to person, place, and time.      Sensory: Sensory deficit present.      Motor: Weakness (Left upper and lower extremity) present.   Psychiatric:         Mood and Affect: Mood normal.         Judgment: Judgment normal.       Laboratory:  Recent Labs     06/24/20  1315   WBC 6.3   RBC 4.16*   HEMOGLOBIN 13.4   HEMATOCRIT 41.1   MCV 98.8*   MCH 32.2   MCHC 32.6*   RDW 46.1   PLATELETCT 238   MPV 9.8     Recent Labs     06/24/20  1315   SODIUM 140  "  POTASSIUM 3.7   CHLORIDE 108   CO2 23   GLUCOSE 91   BUN 16   CREATININE 0.80   CALCIUM 9.3     Recent Labs     06/24/20  1315   ALTSGPT 22   ASTSGOT 27   ALKPHOSPHAT 58   TBILIRUBIN 0.2   GLUCOSE 91     Recent Labs     06/24/20  1315   APTT 29.0   INR 0.91     No results for input(s): NTPROBNP in the last 72 hours.      Recent Labs     06/24/20  1315   TROPONINT <6       Imaging:  DX-CHEST-PORTABLE (1 VIEW)   Final Result      Mild left basilar atelectasis.      CT-CTA NECK WITH & W/O-POST PROCESSING   Final Result      1.  Mild atherosclerotic narrowing of the carotid bulb/proximal internal carotid arteries bilaterally.   2.  No focal high-grade stenosis, dissection or occlusion of the cervical carotid or vertebral arteries.      CT-CTA HEAD WITH & W/O-POST PROCESS   Final Result      No intracranial aneurysm, focal stenosis or abrupt large vessel cut off.      CT-CEREBRAL PERFUSION ANALYSIS   Final Result      1.  Cerebral blood flow less than 30% likely representing completed infarct = 0 mL.      2.  T Max more than 6 seconds likely representing combination of completed infarct and ischemia = 0 mL.      3.  Mismatched volume likely representing ischemic brain/penumbra = None      4.  Please note that the cerebral perfusion was performed on the limited brain tissue around the basal ganglia region. Infarct/ischemia outside the CT perfusion sections can be missed in this study.      CT-HEAD W/O   Final Result      No acute intracranial abnormality.      EC-ECHOCARDIOGRAM COMPLETE W/ CONT    (Results Pending)   MR-BRAIN-W/O    (Results Pending)     Assessment/Plan:    * Left-sided weakness  Assessment & Plan  Admit to Neuro, with continuous cardiac monitoring  CT, showed no evidence for acute infarction, or hemorrhage   Echo with contrast ordered  NPO, till screened by speech  Aspiration, Fall, and seizure precautions    Neuro checks   Neurology consultant, \"no need for transfer\"   Permissive hypertension per " neurology recommendations  Physical, occupational therapy evaluation ordered  Speech therapy evaluation ordered  Physiatry consult placed   Resume home aspirin and Plavix per neurology recommendations at this point    History of stroke  Assessment & Plan  History of previous strokes on aspirin and Plavix, reported compliance.    Hypothyroid- (present on admission)  Assessment & Plan  Resume home levothyroxine 100 micros      Hypothyroidism  Assessment & Plan  Resume home levothyroxine

## 2020-06-24 NOTE — ED NOTES
Pt presents with weakness and dizziness. Per pt she woke up today with left sided weakness and dizziness. Pt reports history of 2 strokes in the past. Code stroke was called. Charge RN made aware, pt roomed immediately.

## 2020-06-25 ENCOUNTER — APPOINTMENT (OUTPATIENT)
Dept: CARDIOLOGY | Facility: MEDICAL CENTER | Age: 80
End: 2020-06-25
Attending: HOSPITALIST
Payer: MEDICARE

## 2020-06-25 ENCOUNTER — APPOINTMENT (OUTPATIENT)
Dept: RADIOLOGY | Facility: MEDICAL CENTER | Age: 80
End: 2020-06-25
Attending: HOSPITALIST
Payer: MEDICARE

## 2020-06-25 VITALS
RESPIRATION RATE: 18 BRPM | SYSTOLIC BLOOD PRESSURE: 119 MMHG | HEIGHT: 61 IN | DIASTOLIC BLOOD PRESSURE: 58 MMHG | HEART RATE: 66 BPM | BODY MASS INDEX: 27.8 KG/M2 | TEMPERATURE: 98.3 F | OXYGEN SATURATION: 92 % | WEIGHT: 147.27 LBS

## 2020-06-25 DIAGNOSIS — I63.9 CEREBROVASCULAR ACCIDENT (CVA), UNSPECIFIED MECHANISM (HCC): ICD-10-CM

## 2020-06-25 LAB
ALBUMIN SERPL BCP-MCNC: 3.5 G/DL (ref 3.2–4.9)
ALBUMIN/GLOB SERPL: 1.5 G/DL
ALP SERPL-CCNC: 47 U/L (ref 30–99)
ALT SERPL-CCNC: 17 U/L (ref 2–50)
ANION GAP SERPL CALC-SCNC: 9 MMOL/L (ref 7–16)
AST SERPL-CCNC: 24 U/L (ref 12–45)
BASOPHILS # BLD AUTO: 0.4 % (ref 0–1.8)
BASOPHILS # BLD: 0.02 K/UL (ref 0–0.12)
BILIRUB SERPL-MCNC: 0.3 MG/DL (ref 0.1–1.5)
BUN SERPL-MCNC: 14 MG/DL (ref 8–22)
CALCIUM SERPL-MCNC: 8.8 MG/DL (ref 8.4–10.2)
CHLORIDE SERPL-SCNC: 111 MMOL/L (ref 96–112)
CHOLEST SERPL-MCNC: 165 MG/DL (ref 100–199)
CO2 SERPL-SCNC: 22 MMOL/L (ref 20–33)
CREAT SERPL-MCNC: 0.77 MG/DL (ref 0.5–1.4)
EOSINOPHIL # BLD AUTO: 0.09 K/UL (ref 0–0.51)
EOSINOPHIL NFR BLD: 2 % (ref 0–6.9)
ERYTHROCYTE [DISTWIDTH] IN BLOOD BY AUTOMATED COUNT: 47.6 FL (ref 35.9–50)
GLOBULIN SER CALC-MCNC: 2.3 G/DL (ref 1.9–3.5)
GLUCOSE SERPL-MCNC: 90 MG/DL (ref 65–99)
HCT VFR BLD AUTO: 38.3 % (ref 37–47)
HDLC SERPL-MCNC: 67 MG/DL
HGB BLD-MCNC: 12.4 G/DL (ref 12–16)
IMM GRANULOCYTES # BLD AUTO: 0 K/UL (ref 0–0.11)
IMM GRANULOCYTES NFR BLD AUTO: 0 % (ref 0–0.9)
LDLC SERPL CALC-MCNC: 81 MG/DL
LV EJECT FRACT  99904: 65
LV EJECT FRACT MOD 2C 99903: 72.94
LV EJECT FRACT MOD 4C 99902: 71.67
LV EJECT FRACT MOD BP 99901: 72.38
LYMPHOCYTES # BLD AUTO: 1.24 K/UL (ref 1–4.8)
LYMPHOCYTES NFR BLD: 27.7 % (ref 22–41)
MAGNESIUM SERPL-MCNC: 1.9 MG/DL (ref 1.5–2.5)
MCH RBC QN AUTO: 32.6 PG (ref 27–33)
MCHC RBC AUTO-ENTMCNC: 32.4 G/DL (ref 33.6–35)
MCV RBC AUTO: 100.8 FL (ref 81.4–97.8)
MONOCYTES # BLD AUTO: 0.34 K/UL (ref 0–0.85)
MONOCYTES NFR BLD AUTO: 7.6 % (ref 0–13.4)
NEUTROPHILS # BLD AUTO: 2.78 K/UL (ref 2–7.15)
NEUTROPHILS NFR BLD: 62.3 % (ref 44–72)
NRBC # BLD AUTO: 0 K/UL
NRBC BLD-RTO: 0 /100 WBC
PLATELET # BLD AUTO: 223 K/UL (ref 164–446)
PMV BLD AUTO: 10.1 FL (ref 9–12.9)
POTASSIUM SERPL-SCNC: 4 MMOL/L (ref 3.6–5.5)
PROT SERPL-MCNC: 5.8 G/DL (ref 6–8.2)
RBC # BLD AUTO: 3.8 M/UL (ref 4.2–5.4)
SODIUM SERPL-SCNC: 142 MMOL/L (ref 135–145)
TRIGL SERPL-MCNC: 83 MG/DL (ref 0–149)
WBC # BLD AUTO: 4.5 K/UL (ref 4.8–10.8)

## 2020-06-25 PROCEDURE — 80061 LIPID PANEL: CPT

## 2020-06-25 PROCEDURE — A9270 NON-COVERED ITEM OR SERVICE: HCPCS | Performed by: HOSPITALIST

## 2020-06-25 PROCEDURE — 70551 MRI BRAIN STEM W/O DYE: CPT

## 2020-06-25 PROCEDURE — 93306 TTE W/DOPPLER COMPLETE: CPT | Mod: 26 | Performed by: INTERNAL MEDICINE

## 2020-06-25 PROCEDURE — 83735 ASSAY OF MAGNESIUM: CPT

## 2020-06-25 PROCEDURE — 700102 HCHG RX REV CODE 250 W/ 637 OVERRIDE(OP): Performed by: HOSPITALIST

## 2020-06-25 PROCEDURE — 93306 TTE W/DOPPLER COMPLETE: CPT

## 2020-06-25 PROCEDURE — 92523 SPEECH SOUND LANG COMPREHEN: CPT

## 2020-06-25 PROCEDURE — 85025 COMPLETE CBC W/AUTO DIFF WBC: CPT

## 2020-06-25 PROCEDURE — 97161 PT EVAL LOW COMPLEX 20 MIN: CPT

## 2020-06-25 PROCEDURE — 80053 COMPREHEN METABOLIC PANEL: CPT

## 2020-06-25 PROCEDURE — 97535 SELF CARE MNGMENT TRAINING: CPT

## 2020-06-25 PROCEDURE — 99217 PR OBSERVATION CARE DISCHARGE: CPT | Performed by: HOSPITALIST

## 2020-06-25 PROCEDURE — 97166 OT EVAL MOD COMPLEX 45 MIN: CPT

## 2020-06-25 PROCEDURE — G0378 HOSPITAL OBSERVATION PER HR: HCPCS

## 2020-06-25 RX ADMIN — ATORVASTATIN CALCIUM 40 MG: 40 TABLET, FILM COATED ORAL at 05:30

## 2020-06-25 RX ADMIN — CLOPIDOGREL BISULFATE 75 MG: 75 TABLET ORAL at 05:30

## 2020-06-25 RX ADMIN — TOPIRAMATE 50 MG: 25 TABLET, FILM COATED ORAL at 05:30

## 2020-06-25 RX ADMIN — ASPIRIN 81 MG: 81 TABLET, COATED ORAL at 05:30

## 2020-06-25 RX ADMIN — LEVOTHYROXINE SODIUM 100 MCG: 0.05 TABLET ORAL at 05:30

## 2020-06-25 ASSESSMENT — ENCOUNTER SYMPTOMS
PSYCHIATRIC NEGATIVE: 1
MUSCULOSKELETAL NEGATIVE: 1
CHILLS: 0
FEVER: 0
EYES NEGATIVE: 1
GASTROINTESTINAL NEGATIVE: 1
CARDIOVASCULAR NEGATIVE: 1
CONSTITUTIONAL NEGATIVE: 1
RESPIRATORY NEGATIVE: 1
SENSORY CHANGE: 1
WEAKNESS: 1

## 2020-06-25 ASSESSMENT — GAIT ASSESSMENTS
GAIT LEVEL OF ASSIST: SUPERVISED
ASSISTIVE DEVICE: SINGLE POINT CANE
DEVIATION: NO DEVIATION
DISTANCE (FEET): 100

## 2020-06-25 ASSESSMENT — COGNITIVE AND FUNCTIONAL STATUS - GENERAL
MOBILITY SCORE: 24
SUGGESTED CMS G CODE MODIFIER DAILY ACTIVITY: CJ
TOILETING: A LITTLE
DAILY ACTIVITIY SCORE: 21
HELP NEEDED FOR BATHING: A LITTLE
PERSONAL GROOMING: A LITTLE
SUGGESTED CMS G CODE MODIFIER MOBILITY: CH

## 2020-06-25 ASSESSMENT — ACTIVITIES OF DAILY LIVING (ADL): TOILETING: INDEPENDENT

## 2020-06-25 NOTE — CARE PLAN
Problem: Venous Thromboembolism (VTW)/Deep Vein Thrombosis (DVT) Prevention:  Goal: Patient will participate in Venous Thrombosis (VTE)/Deep Vein Thrombosis (DVT)Prevention Measures  Outcome: PROGRESSING AS EXPECTED  Note: Pt currently on asa and plavix outpt; waiting for results before resuming.     Problem: Knowledge Deficit  Goal: Knowledge of disease process/condition, treatment plan, diagnostic tests, and medications will improve  Outcome: PROGRESSING AS EXPECTED  Note: Stroke booklet given; have been answering questions as she progresses through the book.

## 2020-06-25 NOTE — PROGRESS NOTES
Telemetry Shift Summary    Rhythm SR with 1st degree AV block  HR Range 60-80  Ectopy rPVC  Measurements 0.22/0.08/0.40        Normal Values  Rhythm SR  HR Range    Measurements 0.12-0.20 / 0.06-0.10  / 0.30-0.52

## 2020-06-25 NOTE — PROGRESS NOTES
Tele strip at 1534 shows SR at 79.      Measurements from am strip were as follows:  CO=0.16  QRS=0.08  QT=0.38    Tele Shift Summary:    Rhythm : SR  Rate : 67-80  Ectopy : Per CCT Stacey, pt had rare PVCs.     Telemetry monitoring strips placed in pt's chart.

## 2020-06-25 NOTE — PROGRESS NOTES
Bedside report given to Knag GUERRA. POC discussed. Pt resting comfortably in bed. Safety precautions in place.

## 2020-06-25 NOTE — THERAPY
Physical Therapy   Initial Evaluation     Patient Name: Tayla Molina  Age:  80 y.o., Sex:  female  Medical Record #: [id number]  Today's Date: 6/25/2020          Assessment  Patient is 80 y.o. female admitted with L sided weakness which has since resolved. She has a prior h/o stroke with residual L sided numbness to the LLE. She presents today with resolution of her symptoms, equal strength bilaterally, no LOB or deviations in her gait pattern. She has no ongoing PT needs at this time and is safe to discharge home with family from a PT standpoint    Plan    Recommend Physical Therapy for Evaluation only     Discharge recommendations:  Anticipate that the patient will have no further physical therapy needs after discharge from the hospital.      Subjective    Very pleasant and eager to work with PT     Objective     06/25/20 1305   Prior Living Situation   Prior Services Home-Independent   Housing / Facility 1 Story House   Steps Into Home 2   Steps In Home 0   Rail Both Rail (Steps into Home)   Equipment Owned Single Point Cane   Lives with - Patient's Self Care Capacity   (lives with her daughter and her family; very supportive)   Prior Level of Functional Mobility   Bed Mobility Independent   Transfer Status Independent   Ambulation Independent   Distance Ambulation (Feet)   (community)   Assistive Devices Used Single Point Cane   Cognition    Comments able to converse; alert; pleasant and very eager to work with PT in order to go home   Strength Lower Body   Comments equal bilaterally   Sensation Lower Body   Comments sensation loss to the L LE (chronic)   Strength Upper Body   Comments equally bilaterally   Gait Analysis   Gait Level Of Assist Supervised   Assistive Device Single Point Cane   Distance (Feet) 100   Deviation No deviation   # of Stairs Climbed 2   Level of Assist with Stairs Supervised   Comments pt had no LOB or deviations in her path   Bed Mobility    Supine to Sit Supervised   Sit to Supine  Supervised   Scooting Supervised   Comments at baseline   Functional Mobility   Sit to Stand Supervised   Mobility ambulated in hallway and in room; up/down stairs   Anticipated Discharge Equipment   DC Equipment None       Key Murphy, PT  [phone]

## 2020-06-25 NOTE — PROGRESS NOTES
Assessment completed, patient A&Ox4, patient reports 5/10 posterior Ha, tylenol given as ordered. NIH and neuro check completed, see flowsheets. Bedside swallow performed, patient passed. MD called for diet order, new orders placed by RN. No other needs at this time.

## 2020-06-25 NOTE — PROGRESS NOTES
Tele strip at 1707 shows SR at 68.      Measurements from am strip were as follows:  UT=0.20  QRS=0.08  QT=0.40    Tele Shift Summary:    Rhythm : SR  Rate : 68-70s  Ectopy : Per CCT Hilda, pt had no ectopy.     Telemetry monitoring strips placed in pt's chart.

## 2020-06-25 NOTE — DIETARY
"Nutrition services: Day 0 of admit.  Tayla Molina is a 80 y.o. female with admitting DX of Left sided weakness.  Consult received for MST score of 2 per nutrition screen for unplanned weight loss of 2-13 lb x 3 months with poor PO intake.    Assessment:  Height: 154.9 cm (5' 1\")  Weight: 66.8 kg (147 lb 4.3 oz)  Body mass index is 27.83 kg/m²., BMI classification: Overweight.  Diet/Intake: Regular    Evaluation:   1. Pt states she lost about 10 lb over 3 months. States her appetite is not always good. She also believes some weight loss due to healthier diet as her family member is on a diet and so the whole family is eating healthier.  2. Per chart review, weight on 2/20/20 = 68 kg. Pt with 1.7% weight loss x 4 months which is not significant.  3. States she is currently eating about 50% of meals. Recorded PO intake for breakfast this morning = 50-75%. She was just starting lunch at time of visit, but stated it looked good.  4. No skin breakdown noted.  5. Medications and labs reviewed.    Malnutrition Risk: Criteria not met    Recommendations/Plan:  1. Regular diet as ordered.   2. Encourage continued intake of meals >50%.  3. Document intake of all meals as % taken in ADL's to provide interdisciplinary communication across all shifts.   4. Monitor weight.  5. Nutrition rep will continue to see patient for ongoing meal and snack preferences.             "

## 2020-06-25 NOTE — THERAPY
"Speech Language Pathology   Initial Assessment     Patient Name: Tayla Molina  AGE:  80 y.o., SEX:  female  Medical Record #: [id number]  Today's Date: 6/25/2020          Assessment    Patient is 80 y.o. female with a diagnosis of L-sided weakness with concerns for CVA. Pt with PMHx notable for CVA. MRI revealed: \"Subtle subcentimeter focus of T2 hyperintensity in the right paramedian roberta without corresponding restricted diffusion. Most likely chronic microvascular ischemic change. Occasionally, acute brainstem infarction can show delayed diffusion signal changes and an early acute brainstem infarct is not entirely excluded. If early acute brainstem infarct is strongly suspected clinically, follow-up Limited MRI with diffusion weighted axial and T2 axial images could be pursued.\" Additional factors influencing patient status/progress: n/a.    Pt seen today for cognitive-linguistic evaluation. Pt endorsed mild acute cognitive changes; however, denied changes in speech/language. Pt scored WFL for all subtests (compared against same-aged peers): Attention, Memory, Language, Executive Functions and Visuospatial Skills. Pt also scored WFL for Clock Drawing. Pt was also administered informal assessments of following written directions (60% accuracy), reading comprehension (50% accuracy) and medication management (33% accuracy.) However, suspect fatigue likely influenced Pt's performance at end of session, as well as distractibility given noise/conversation in room with Pt's roommate.    Recommend assistance following discharge with Pt's medications (RN indicates Pt's daughter helps with meds) as well as assistance with finances and following other written directions. If new and/or worsening symptoms arise, consider referral to outpatient SLP services to address the aforementioned skills.    Plan    Recommend Speech Therapy for Evaluation only     Discharge recommendations:  SLP evaluation completed.  Patient is currently " not being actively followed for therapy services at this time, however may be seen if requested by attending provider for 1 more visit within 30 days to address any discharge needs or if the patient has a change in status.         Objective     06/25/20 1010   Prior Level Of Function   Communication Within Functional Limits   Verbal Expression   Vocal Quality Clear   Verbal Output Conversation Within Functional Limits (6-7)   Naming Within Functional Limits (6-7)   Auditory Comprehension   Yes / No Questions: Personal Information Within Functional Limits (6-7)   Follows One Unit Commands Within Functional Limits (6-7)   Follows Two Unit Commands Within Functional Limits (6-7)   Follows Three Unit Commands Within Functional Limits (6-7)   Understands Simple, Structured Conversation  Within Functional Limits (6-7)   Reading Comprehension   Reading Short Paragraphs  Moderate (3)   Following Written Direction Severe (2)   Written Expression   Ability To Copy: Sentences Supervision (5)   Functional Writing: Name Within Functional Limits (6-7)   Overall Legibility Within Functional Limits (6-7)   Cognitive-Linguistic   Level of Consciousness Alert   Orientation Level Not Oriented to Day   Sustained Attention Within Functional Limits (6-7)   Alternating Attention Moderate (3)   Visual Scanning / Cancellation Skills Within Functional Limits (6-7)   Short Term Memory Within Functional Limits (6-7)   Path Finding Minimal (4)   Executive Functioning / Organization Within Functional Limits (6-7)   Medication Management  Moderate (3)   Clock Drawing Impaired Hand Placement   Outcome Measures   Outcome Measures Utilized CLQT   CLQT (Cognitive Linguistic Quick Test)   Attention 4   Memory 4   Executive Function 4   Language 4   Visuospatial 4   Total 4   Composite Severity Rating WNL   Clock Drawing Severity Rating WNL   Session Information   Priority 0  (CLQT WFL; eval only)

## 2020-06-25 NOTE — CARE PLAN
Problem: Communication  Goal: The ability to communicate needs accurately and effectively will improve  Outcome: PROGRESSING AS EXPECTED  Note: Pt oriented to the call light. POC discussed including MRI and echo, pt educated on unfamiliar medications, encouraged to call for assistance       Problem: Safety  Goal: Will remain free from injury  Outcome: PROGRESSING AS EXPECTED  Note: Pt call light and belongings with in reach, bed in locked and low position, treaded socks on, bed rails up x2

## 2020-06-25 NOTE — THERAPY
"Occupational Therapy   Initial Evaluation     Patient Name: Tayla Molina  Age:  80 y.o., Sex:  female  Medical Record #: [id number]  Today's Date: 6/25/2020          Assessment  Patient is 80 y.o. female with a diagnosis of L sided weakness, numbness, headache.  Additional factors influencing patient status / progress: Pt has h/o previous CVA's.  Pt reports prior she is indep with ADL's, dtr assists with most IADL's.  Currently pt mobilizing with supervision, performing simple ADL's supervised.  Appears to have minimal residual deficits from admit except perhaps some numbness on L.  From OT perspective, She is functionally safe to return home with family assist when cleared medically.    Plan    Recommend Occupational Therapy for Evaluation only     Discharge recommendations:  Home with family assist     06/25/20 1245   Prior Living Situation   Prior Services Home-Independent   Housing / Facility 1 Story House   Steps Into Home 3   Rail Right Rail (Steps into Home)   Bathroom Set up Walk In Shower  (plans to put in suction cup bars)   Equipment Owned Single Point Cane  (\"walking stick\" type )   Lives with - Patient's Self Care Capacity Adult Children  (Dtr and JORGE LUIS and grnddtr)   Comments Pt reports that Dtr and JORGE LUIS work  but granddtr home, dtr normally does all the cooking, cleaning shopping etc   Prior Level of ADL Function   Self Feeding Independent   Grooming / Hygiene Independent   Bathing Independent   Dressing Independent   Toileting Independent   Prior Level of IADL Function   Medication Management Independent   Laundry Requires Assist   Kitchen Mobility Requires Assist   Finances Independent   Home Management Requires Assist   Shopping Requires Assist   Prior Level Of Mobility Independent With Device in Home  (uses cane intermittently)   Driving / Transportation Relatives / Others Provide Transportation   Occupation (Pre-Hospital Vocational) Retired Due To Age   Leisure Interests Family   Cognition  "   Cognition / Consciousness WDL   Level of Consciousness Alert   Comments Appears alert, oriented, able to give good PLOF information   Strength Upper Body   Upper Body Strength  WDL   Sensation Upper Body   Upper Extremity Sensation  X   Comments Mild numbness in LUE, tempurature and pressure sensation intact   ADL Assessment   Eating Independent   Grooming Supervision;Standing   Lower Body Dressing Supervision  (socks)   Toileting   (reports supervised, denied need to demo)   Functional Mobility   Sit to Stand Supervised   Bed, Chair, Wheelchair Transfer Supervised   Toilet Transfers Supervised   Transfer Method Stand Step   Mobility amb in room and out into mera supervised no device   Education Group   Additional Comments Educated on UE exercises with light weights, WB activity and sensory stim activities to help with decreased sensation on L UE and LLE   Anticipated Discharge Equipment   DC Equipment None

## 2020-06-25 NOTE — PROGRESS NOTES
Report received from Suzy, pt resting at the time.    Transport at bedside about 0840 to take her for MRI.  Assessment done at this time and she reports no changes in numbness or dullness from when she came in on her Left upper extremity otherwise everything else is intact.  When pt returned from MRI, called gualberto and Bakari came to bedside.  When he finished Janene from speech completed a cognitive eval.   After this walked with pt to the main nursing station using her cane; steady gait and no leaning.  NP rounded and discussed poc and what we are waiting on as pt wanting to go home later today.

## 2020-06-25 NOTE — DISCHARGE INSTRUCTIONS
Depression / Suicide Risk    As you are discharged from this [facility] facility, it is important to learn how to keep safe from harming yourself.    Recognize the warning signs:  · Abrupt changes in personality, positive or negative- including increase in energy   · Giving away possessions  · Change in eating patterns- significant weight changes-  positive or negative  · Change in sleeping patterns- unable to sleep or sleeping all the time   · Unwillingness or inability to communicate  · Depression  · Unusual sadness, discouragement and loneliness  · Talk of wanting to die  · Neglect of personal appearance   · Rebelliousness- reckless behavior  · Withdrawal from people/activities they love  · Confusion- inability to concentrate     If you or a loved one observes any of these behaviors or has concerns about self-harm, here's what you can do:  · Talk about it- your feelings and reasons for harming yourself  · Remove any means that you might use to hurt yourself (examples: pills, rope, extension cords, firearm)  · Get professional help from the community (Mental Health, Substance Abuse, psychological counseling)  · Do not be alone:Call your Safe Contact- someone whom you trust who will be there for you.  · Call your local CRISIS HOTLINE 046-4555 or 396-753-3228  · Call your local Children's Mobile Crisis Response Team [place] (772) 013-2896 or www.[employer]  · Call the toll free National Suicide Prevention Hotlines   · National Suicide Prevention Lifeline 859-177-MINE (1449)  · National Hope Line Network 800-SUICIDE (815-1358)        Discharge Instructions    Discharged to home by car with relative. Discharged via wheelchair, hospital escort: Yes.  Special equipment needed: Cane    Be sure to schedule a follow-up appointment with your primary care doctor or any specialists as instructed.     Discharge Plan:   Diet Plan: Discussed  Activity Level: Discussed  Confirmed Follow up Appointment: Patient  to Call and Schedule Appointment  Confirmed Symptoms Management: Discussed  Medication Reconciliation Updated: Yes    I understand that a diet low in cholesterol, fat, and sodium is recommended for good health. Unless I have been given specific instructions below for another diet, I accept this instruction as my diet prescription.   Other diet: keep yourself hydrated    Special Instructions:     Ischemic Stroke Treated Without Warfarin  An ischemic stroke is the sudden death of brain tissue. Blood carries oxygen to all areas of your body. This type of stroke happens when your blood does not flow to your brain like normal. Your brain cannot get the oxygen it needs. This is an emergency.  Symptoms of any stroke usually happen all of the sudden. You may notice them when you wake up. They can include sudden:  · Weakness or loss of feeling in your face, arm, or leg, especially on one side of your body.  · Confusion.  · Trouble talking or understanding.  · Trouble seeing.  · Trouble walking or moving your arms or legs.  · Dizziness.  · Loss of balance or coordination.  · Severe headache with no known cause.  Get help as soon as any of these problems start. This is important so your doctor can treat you right away with:  · Medicines that dissolve blood clots.  · A device to remove a blood clot.  A few hours after the start of your symptoms, your doctor may treat you with:  · Oxygen.  · Medicines.  · A surgery to widen blood vessels.  What increases the risk?  Risk factors are things that make it more likely for you to have a stroke. These include:  · High blood pressure.  · High cholesterol.  · Diabetes.  · Heart disease.  · Having a buildup of fatty deposits in the blood vessels.  · Having an abnormal heart rhythm (atrial fibrillation).  · Being very overweight (obese).  · Smoking.  · Taking birth control pills, especially if you smoke.  · Not being active.  · Having a diet that is high in fats, salt, and  calories.  · Drinking too much alcohol.  · Using illegal drugs.  · Being .  · Being over the age of 55.  · Having a family history of stroke.  · Having a history of blood clots, stroke, warning stroke (transient ischemic attack, TIA), or heart attack.  · Sickle cell disease.  Follow these instructions at home:  · Take all medicines exactly as told by your doctor. Understand all your medicine instructions.  · If you are able to swallow, eat healthy foods. Eat 5 or more servings of fruits and vegetables each day. Eat soft foods or pureed foods, or eat small bites of food so you do not choke.  · Stay active. Try to get at least 30 minutes of activity on most or all days.  · Do not smoke.  · Limit or stop alcohol use.  · Keep your home safe so you do not fall. Try:  ¨ Putting grab bars in the bedroom and bathroom.  ¨ Raising toilet seats.  ¨ Putting a seat in the shower.  · Go to physical, occupational, and speech therapy sessions as told by your doctor.  · Use a walker or a cane at all times if told to do so.  · Keep all doctor visits as told.  Get help right away if:  · You have sudden weakness or loss of feeling in your face, arm, or leg, especially on one side of your body.  · You are suddenly confused.  · You have trouble talking or understanding.  · You have sudden trouble seeing.  · You have sudden trouble walking or moving your arms or legs.  · You have sudden dizziness.  · You lose your balance or your movements are not smooth.  · You have a sudden, severe headache with no known cause.  · You are partly unaware or totally unaware of what is going on around you.  Any of these symptoms may be a sign of an emergency. Do not wait to see if the symptoms go away. Call for help (911 in [place] Do not drive yourself to the hospital.   This information is not intended to replace advice given to you by your health care provider. Make sure you discuss any questions you have with your health care  provider.  Document Released: 10/02/2015 Document Revised: 05/25/2017 Document Reviewed: 06/07/2015  [employer] Interactive Patient Education © 2017 [employer]      · Is patient discharged on Warfarin / Coumadin?   No     Depression / Suicide Risk    As you are discharged from this [facility] facility, it is important to learn how to keep safe from harming yourself.    Recognize the warning signs:  · Abrupt changes in personality, positive or negative- including increase in energy   · Giving away possessions  · Change in eating patterns- significant weight changes-  positive or negative  · Change in sleeping patterns- unable to sleep or sleeping all the time   · Unwillingness or inability to communicate  · Depression  · Unusual sadness, discouragement and loneliness  · Talk of wanting to die  · Neglect of personal appearance   · Rebelliousness- reckless behavior  · Withdrawal from people/activities they love  · Confusion- inability to concentrate     If you or a loved one observes any of these behaviors or has concerns about self-harm, here's what you can do:  · Talk about it- your feelings and reasons for harming yourself  · Remove any means that you might use to hurt yourself (examples: pills, rope, extension cords, firearm)  · Get professional help from the community (Mental Health, Substance Abuse, psychological counseling)  · Do not be alone:Call your Safe Contact- someone whom you trust who will be there for you.  · Call your local CRISIS HOTLINE 717-0094 or 969-395-0076  · Call your local Children's Mobile Crisis Response Team [place] (309) 800-9601 or www.[employer]  · Call the toll free National Suicide Prevention Hotlines   · National Suicide Prevention Lifeline 698-630-SXRB (0863)  · National Hope Line Network 800-SUICIDE (127-9131)

## 2020-06-26 NOTE — PROGRESS NOTES
Discharging Patient home per physician order.  Discharged with daughter to home at 1800.  Demonstrated understanding of discharge instructions, follow up appointments, home medications, prescriptions sent to NA, and nursing care instructions for stroke.  Ambulating without assistance, voiding without difficulty, pain well controlled, tolerating oral medications, oxygen saturation greater than 90% , tolerating diet.   Educational handouts given and discussed.  Verbalized understanding of discharge instructions and educational handouts.  All questions answered.  Belongings with patient at time of discharge. Pt was sent home with stroke booklet.

## 2020-06-26 NOTE — DISCHARGE SUMMARY
Discharge Summary    CHIEF COMPLAINT ON ADMISSION  Chief Complaint   Patient presents with   • Possible Stroke       Reason for Admission  Possible Stroke     Admission Date  6/24/2020    CODE STATUS  Full Code    HPI & HOSPITAL COURSE     Ms. Molina is a 80 y.o. female with PMHx of previous 2 strokes on aspirin and Plavix who presented 6/24/2020 with left-sided weakness, left-sided facial numbness and dizziness this morning.  Last known normal was the night of 6/23-24.  Patient denies having loss of consciousness seizures, abnormal jerking movement.  She denies falling and denies hitting her head.  She reports that her weakness is moderate and slightly improved.  However still not back to normal.  CT scan of the head shows no evidence of acute infarction or hemorrhage.  Patient admitted for further work-up, evaluation and treatment.         During this admission, Neurology, Dr. Estrada was consulted with some recommendations:  1.  MRI brain  IMPRESSION:   -Old lacunar size infarct right thalamus.  -Minimal supratentorial white matter disease most consistent with microvascular ischemic change. Common findings for the patient's age.  -Subtle subcentimeter focus of T2 hyperintensity in the right paramedian roberta without corresponding restricted diffusion. Most likely chronic microvascular ischemic change. Occasionally, acute brainstem infarction can show delayed diffusion signal   changes and an early acute brainstem infarct is not entirely excluded. If early acute brainstem infarct is strongly suspected clinically, follow-up Limited MRI with diffusion weighted axial and T2 axial images could be pursued.  -No evidence of acute hemorrhage or mass lesion.  2.  Permissive hypertension for the next 24 to 48 hours, keep below systolic 220.  3.  Continue aspirin and Plavix  4.  Consider TEG studies for Plavix response - RESULTED  5.  Telemetry monitoring and TTE with bubble - confirmed with OSIEL Maria (not needed at this  time)  6.  High-dose statin for an LDL goal below 70 - continue  7.  PT/OT/speech therapy  8.  Glucose per primary recommendations normoglycemia  9.  Patient does not need anticoagulation at this time unless find a cause such as a flutter/A. fib, DVT, or PE.  If MRI brain shows a pattern of infarct that suggests cardioembolic phenomenon, at that time rec long-term monitoring such as with a loop recorder or patch     Patient seen and examined prior to being d/c. Patient notified in POC. Patient to follow up with PCP and Neurology. Resume all home medications. All questions and concerns answered prior to being d/c. Spoke to patient and daughter bedside prior to d/c. Patient d/c home.     Therefore, she is discharged in good and stable condition to home with close outpatient follow-up.    The patient recovered much more quickly than anticipated on admission.    Discharge Date  06/25/20      FOLLOW UP ITEMS POST DISCHARGE  Please call [phone] to schedule PCP appointment for patient.    Required specialty appointments include:       Discharge Instructions per MARLA Mittal  -Follow up with PCP  -Follow up with Neurology  -RESUME all home medication    DIET: as tolerated    ACTIVITY: as tolerated    DIAGNOSIS: LEFT sided weakness    Return to ER if symptoms persists, chest pain, palpitations, shortness of breath, numbness, tingling, weakness, HIGH fevers.     DISCHARGE DIAGNOSES  Principal Problem:    Left-sided weakness POA: Unknown  Active Problems:    History of stroke POA: Unknown    Hypothyroidism POA: Unknown    Hypothyroid POA: Yes  Resolved Problems:    * No resolved hospital problems. *      FOLLOW UP  No future appointments.  Nathaniel Jauregui M.D.  [address] [place] [address] [id number] [phone]    Schedule an appointment as soon as possible for a visit in 1 week        MEDICATIONS ON DISCHARGE     Medication List      CONTINUE taking these medications      Instructions    aspirin EC 81 MG Tbec  Commonly known as:  ECOTRIN   Take 81 mg by mouth every day.  Dose:  81 mg     atorvastatin 20 MG Tabs  Commonly known as:  LIPITOR   Take 20 mg by mouth every day.  Dose:  20 mg     B COMPLEX PO   Take 1 Tab by mouth every day.  Dose:  1 Tab     Biotin 5000 MCG Caps   Take 5,000 mcg by mouth every day.  Dose:  5,000 mcg     CALCIUM 600 PO   Take 600 mg by mouth every day.  Dose:  600 mg     celecoxib 200 MG Caps  Commonly known as:  CELEBREX   Take 200 mg by mouth every 48 hours.  Dose:  200 mg     clopidogrel 75 MG Tabs  Commonly known as:  PLAVIX   Take 75 mg by mouth every day.  Dose:  75 mg     levothyroxine 100 MCG Tabs  Commonly known as:  SYNTHROID   Take 100 mcg by mouth Every morning on an empty stomach.  Dose:  100 mcg     multivitamin Tabs   Take 1 Tab by mouth every day.  Dose:  1 Tab     topiramate 25 MG Tabs  Commonly known as:  TOPAMAX   Take 50 mg by mouth 2 times a day.  Dose:  50 mg     traZODone 50 MG Tabs  Commonly known as:  DESYREL   Take 50 mg by mouth every evening.  Dose:  50 mg     TURMERIC PO   Take 500 mg by mouth every day.  Dose:  500 mg     Xeljanz 5 MG Tabs  Generic drug:  Tofacitinib Citrate   Take 10 mg by mouth every day.  Dose:  10 mg            Allergies  Allergies   Allergen Reactions   • Codeine Vomiting     Itching   • Food Anaphylaxis     Kiwi       DIET  Orders Placed This Encounter   Procedures   • Diet Order Regular     Standing Status:   Standing     Number of Occurrences:   1     Order Specific Question:   Diet:     Answer:   Regular [1]       ACTIVITY  As tolerated.  Weight bearing as tolerated    CONSULTATIONS  Neurology - Dr. Jauregui    PROCEDURES  NONE    IMAGING  EC-ECHOCARDIOGRAM COMPLETE W/O CONT   Final Result      MR-BRAIN-W/O   Final Result      1.  Old lacunar size infarct right thalamus.   2.  Minimal supratentorial white matter disease most consistent with microvascular ischemic change. Common findings for the patient's age.   3.   Subtle subcentimeter focus of T2 hyperintensity in the right paramedian roberta without corresponding restricted diffusion. Most likely chronic microvascular ischemic change. Occasionally, acute brainstem infarction can show delayed diffusion signal    changes and an early acute brainstem infarct is not entirely excluded. If early acute brainstem infarct is strongly suspected clinically, follow-up Limited MRI with diffusion weighted axial and T2 axial images could be pursued.   4.  No evidence of acute hemorrhage or mass lesion.      DX-CHEST-PORTABLE (1 VIEW)   Final Result      Mild left basilar atelectasis.      CT-CTA NECK WITH & W/O-POST PROCESSING   Final Result      1.  Mild atherosclerotic narrowing of the carotid bulb/proximal internal carotid arteries bilaterally.   2.  No focal high-grade stenosis, dissection or occlusion of the cervical carotid or vertebral arteries.      CT-CTA HEAD WITH & W/O-POST PROCESS   Final Result      No intracranial aneurysm, focal stenosis or abrupt large vessel cut off.      CT-CEREBRAL PERFUSION ANALYSIS   Final Result      1.  Cerebral blood flow less than 30% likely representing completed infarct = 0 mL.      2.  T Max more than 6 seconds likely representing combination of completed infarct and ischemia = 0 mL.      3.  Mismatched volume likely representing ischemic brain/penumbra = None      4.  Please note that the cerebral perfusion was performed on the limited brain tissue around the basal ganglia region. Infarct/ischemia outside the CT perfusion sections can be missed in this study.      CT-HEAD W/O   Final Result      No acute intracranial abnormality.            LABORATORY  Lab Results   Component Value Date    SODIUM 142 06/25/2020    POTASSIUM 4.0 06/25/2020    CHLORIDE 111 06/25/2020    CO2 22 06/25/2020    GLUCOSE 90 06/25/2020    BUN 14 06/25/2020    CREATININE 0.77 06/25/2020        Lab Results   Component Value Date    WBC 4.5 (L) 06/25/2020    HEMOGLOBIN 12.4  06/25/2020    HEMATOCRIT 38.3 06/25/2020    PLATELETCT 223 06/25/2020        Total time of the discharge process exceeds 36 minutes.  --------------------------------------------------------------------------------------------------------------------------------------------------------------------------------------------------------------------------------------------------------------------------------------------------------------  Please note that this dictation was created using voice recognition software. I have made every reasonable attempt to correct obvious errors, but there may be errors of grammar and possibly content that I did not discover before finalizing the note.    Electronically signed by:  BENJAMIN Sanchez, MSN, APRN, FNP-C  Hospitalist Services  [facility]  [phone]  [email or web address]  06/25/20      2614

## 2020-07-13 ENCOUNTER — OFFICE VISIT (OUTPATIENT)
Dept: NEUROLOGY | Facility: MEDICAL CENTER | Age: 80
End: 2020-07-13
Payer: COMMERCIAL

## 2020-07-13 VITALS
SYSTOLIC BLOOD PRESSURE: 116 MMHG | DIASTOLIC BLOOD PRESSURE: 58 MMHG | RESPIRATION RATE: 16 BRPM | TEMPERATURE: 96.8 F | OXYGEN SATURATION: 96 % | BODY MASS INDEX: 28.72 KG/M2 | HEART RATE: 78 BPM | HEIGHT: 61 IN | WEIGHT: 152.12 LBS

## 2020-07-13 DIAGNOSIS — Z86.73 HISTORY OF STROKE: ICD-10-CM

## 2020-07-13 PROCEDURE — 99215 OFFICE O/P EST HI 40 MIN: CPT | Performed by: NURSE PRACTITIONER

## 2020-07-13 RX ORDER — ROSUVASTATIN CALCIUM 20 MG/1
20 TABLET, COATED ORAL EVERY EVENING
Qty: 90 TAB | Refills: 3 | Status: SHIPPED | OUTPATIENT
Start: 2020-07-13 | End: 2020-08-19 | Stop reason: SDUPTHER

## 2020-07-13 ASSESSMENT — ENCOUNTER SYMPTOMS
NECK PAIN: 1
CHILLS: 0
BLURRED VISION: 0
FEVER: 0
SHORTNESS OF BREATH: 0
COUGH: 0
HEARTBURN: 0
DEPRESSION: 0
MYALGIAS: 0
BRUISES/BLEEDS EASILY: 1
DOUBLE VISION: 0
NERVOUS/ANXIOUS: 0
SEIZURES: 0
SENSORY CHANGE: 1
HEADACHES: 1

## 2020-07-13 ASSESSMENT — FIBROSIS 4 INDEX: FIB4 SCORE: 2.04

## 2020-07-13 ASSESSMENT — PATIENT HEALTH QUESTIONNAIRE - PHQ9: CLINICAL INTERPRETATION OF PHQ2 SCORE: 0

## 2020-07-13 NOTE — PATIENT INSTRUCTIONS
Stop atorvastatin and start rosuvastatin (Crestor) 20mg every night, for refills and monitoring see primary care.    Stop plavix (clopidrigrel) and continue ASA 81mg only         Start magnesium oxide 400mg by mouth every night, may take extra dose if needed for headache (over the counter), hold for diarrhea         Start Riboflavin (Vitamin B2) 400mg by mouth every night (over the counter),may turn urine bright yellow         Start COQ 10, take 200mg every night. (over the counter)          Attempt to go to bed and get up at the same time every night           Eat meals on regular basis            Stay hydrated.             Aerobic exercise 30 minutes daily             Avoid aged or smoked foods, avoid processed foods, red wine, aged cheese              Keep headache diary, include foods that you may have eaten.             Avoid overusing over the counter medications:  Do not take more than 500mg acetaminophen (tylenol), more than 4 times weekly, more frequent or larger doses are associated with medication overuse headache.                Migraine Headache  A migraine headache is a very strong throbbing pain on one side or both sides of your head. This type of headache can also cause other symptoms. It can last from 4 hours to 3 days. Talk with your doctor about what things may bring on (trigger) this condition.  What are the causes?  The exact cause of this condition is not known. This condition may be triggered or caused by:  · Drinking alcohol.  · Smoking.  · Taking medicines, such as:  ? Medicine used to treat chest pain (nitroglycerin).  ? Birth control pills.  ? Estrogen.  ? Some blood pressure medicines.  · Eating or drinking certain products.  · Doing physical activity.  Other things that may trigger a migraine headache include:  · Having a menstrual period.  · Pregnancy.  · Hunger.  · Stress.  · Not getting enough sleep or getting too much sleep.  · Weather changes.  · Tiredness (fatigue).  What increases  the risk?  · Being 25-55 years old.  · Being female.  · Having a family history of migraine headaches.  · Being .  · Having depression or anxiety.  · Being very overweight.  What are the signs or symptoms?  · A throbbing pain. This pain may:  ? Happen in any area of the head, such as on one side or both sides.  ? Make it hard to do daily activities.  ? Get worse with physical activity.  ? Get worse around bright lights or loud noises.  · Other symptoms may include:  ? Feeling sick to your stomach (nauseous).  ? Vomiting.  ? Dizziness.  ? Being sensitive to bright lights, loud noises, or smells.  · Before you get a migraine headache, you may get warning signs (an aura). An aura may include:  ? Seeing flashing lights or having blind spots.  ? Seeing bright spots, halos, or zigzag lines.  ? Having tunnel vision or blurred vision.  ? Having numbness or a tingling feeling.  ? Having trouble talking.  ? Having weak muscles.  · Some people have symptoms after a migraine headache (postdromal phase), such as:  ? Tiredness.  ? Trouble thinking (concentrating).  How is this treated?  · Taking medicines that:  ? Relieve pain.  ? Relieve the feeling of being sick to your stomach.  ? Prevent migraine headaches.  · Treatment may also include:  ? Having acupuncture.  ? Avoiding foods that bring on migraine headaches.  ? Learning ways to control your body functions (biofeedback).  ? Therapy to help you know and deal with negative thoughts (cognitive behavioral therapy).  Follow these instructions at home:  Medicines  · Take over-the-counter and prescription medicines only as told by your doctor.  · Ask your doctor if the medicine prescribed to you:  ? Requires you to avoid driving or using heavy machinery.  ? Can cause trouble pooping (constipation). You may need to take these steps to prevent or treat trouble pooping:  § Drink enough fluid to keep your pee (urine) pale yellow.  § Take over-the-counter or prescription  medicines.  § Eat foods that are high in fiber. These include beans, whole grains, and fresh fruits and vegetables.  § Limit foods that are high in fat and sugar. These include fried or sweet foods.  Lifestyle  · Do not drink alcohol.  · Do not use any products that contain nicotine or tobacco, such as cigarettes, e-cigarettes, and chewing tobacco. If you need help quitting, ask your doctor.  · Get at least 8 hours of sleep every night.  · Limit and deal with stress.  General instructions         · Keep a journal to find out what may bring on your migraine headaches. For example, write down:  ? What you eat and drink.  ? How much sleep you get.  ? Any change in what you eat or drink.  ? Any change in your medicines.  · If you have a migraine headache:  ? Avoid things that make your symptoms worse, such as bright lights.  ? It may help to lie down in a dark, quiet room.  ? Do not drive or use heavy machinery.  ? Ask your doctor what activities are safe for you.  · Keep all follow-up visits as told by your doctor. This is important.  Contact a doctor if:  · You get a migraine headache that is different or worse than others you have had.  · You have more than 15 headache days in one month.  Get help right away if:  · Your migraine headache gets very bad.  · Your migraine headache lasts longer than 72 hours.  · You have a fever.  · You have a stiff neck.  · You have trouble seeing.  · Your muscles feel weak or like you cannot control them.  · You start to lose your balance a lot.  · You start to have trouble walking.  · You pass out (faint).  · You have a seizure.  Summary  · A migraine headache is a very strong throbbing pain on one side or both sides of your head. These headaches can also cause other symptoms.  · This condition may be treated with medicines and changes to your lifestyle.  · Keep a journal to find out what may bring on your migraine headaches.  · Contact a doctor if you get a migraine headache that is  different or worse than others you have had.  · Contact your doctor if you have more than 15 headache days in a month.  This information is not intended to replace advice given to you by your health care provider. Make sure you discuss any questions you have with your health care provider.  Document Released: 09/26/2009 Document Revised: 04/10/2020 Document Reviewed: 01/30/2020  Elsevier Patient Education © 2020 [employer]  Stroke Prevention  Some medical conditions and lifestyle choices can lead to a higher risk for a stroke. You can help to prevent a stroke by making nutrition, lifestyle, and other changes.  What nutrition changes can be made?    · Eat healthy foods.  ? Choose foods that are high in fiber. These include:  § Fresh fruits.  § Fresh vegetables.  § Whole grains.  ? Eat at least 5 or more servings of fruits and vegetables each day. Try to fill half of your plate at each meal with fruits and vegetables.  ? Choose lean protein foods. These include:  § Lowfat (lean) cuts of meat.  § Chicken without skin.  § Fish.  § Tofu.  § Beans.  § Nuts.  ? Eat low-fat dairy products.  ? Avoid foods that:  § Are high in salt (sodium).  § Have saturated fat.  § Have trans fat.  § Have cholesterol.  § Are processed.  § Are premade.  · Follow eating guidelines as told by your doctor. These may include:  ? Reducing how many calories you eat and drink each day.  ? Limiting how much salt you eat or drink each day to 1,500 milligrams (mg).  ? Using only healthy fats for cooking. These include:  § Olive oil.  § Canola oil.  § Sunflower oil.  ? Counting how many carbohydrates you eat and drink each day.  What lifestyle changes can be made?  · Try to stay at a healthy weight. Talk to your doctor about what a good weight is for you.  · Get at least 30 minutes of moderate physical activity at least 5 days a week. This can include:  ? Fast walking.  ? Biking.  ? Swimming.  · Do not use any products that have nicotine or tobacco.  This includes cigarettes and e-cigarettes. If you need help quitting, ask your doctor. Avoid being around tobacco smoke in general.  · Limit how much alcohol you drink to no more than 1 drink a day for nonpregnant women and 2 drinks a day for men. One drink equals 12 oz of beer, 5 oz of wine, or 1½ oz of hard liquor.  · Do not use drugs.  · Avoid taking birth control pills. Talk to your doctor about the risks of taking birth control pills if:  ? You are over 35 years old.  ? You smoke.  ? You get migraines.  ? You have had a blood clot.  What other changes can be made?  · Manage your cholesterol.  ? It is important to eat a healthy diet.  ? If your cholesterol cannot be managed through your diet, you may also need to take medicines. Take medicines as told by your doctor.  · Manage your diabetes.  ? It is important to eat a healthy diet and to exercise regularly.  ? If your blood sugar cannot be managed through diet and exercise, you may need to take medicines. Take medicines as told by your doctor.  · Control your high blood pressure (hypertension).  ? Try to keep your blood pressure below 130/80. This can help lower your risk of stroke.  ? It is important to eat a healthy diet and to exercise regularly.  ? If your blood pressure cannot be managed through diet and exercise, you may need to take medicines. Take medicines as told by your doctor.  ? Ask your doctor if you should check your blood pressure at home.  ? Have your blood pressure checked every year. Do this even if your blood pressure is normal.  · Talk to your doctor about getting checked for a sleep disorder. Signs of this can include:  ? Snoring a lot.  ? Feeling very tired.  · Take over-the-counter and prescription medicines only as told by your doctor. These may include aspirin or blood thinners (antiplatelets or anticoagulants).  · Make sure that any other medical conditions you have are managed.  Where to find more information  · American Stroke  "Association: www.strokeassociation.org  · National Stroke Association: www.stroke.org  Get help right away if:  · You have any symptoms of stroke. \"BE FAST\" is an easy way to remember the main warning signs:  ? B - Balance. Signs are dizziness, sudden trouble walking, or loss of balance.  ? E - Eyes. Signs are trouble seeing or a sudden change in how you see.  ? F - Face. Signs are sudden weakness or loss of feeling of the face, or the face or eyelid drooping on one side.  ? A - Arms. Signs are weakness or loss of feeling in an arm. This happens suddenly and usually on one side of the body.  ? S - Speech. Signs are sudden trouble speaking, slurred speech, or trouble understanding what people say.  ? T - Time. Time to call emergency services. Write down what time symptoms started.  · You have other signs of stroke, such as:  ? A sudden, very bad headache with no known cause.  ? Feeling sick to your stomach (nausea).  ? Throwing up (vomiting).  ? Jerky movements you cannot control (seizure).  These symptoms may represent a serious problem that is an emergency. Do not wait to see if the symptoms will go away. Get medical help right away. Call your local emergency services (911 in the [place] Do not drive yourself to the hospital.  Summary  · You can prevent a stroke by eating healthy, exercising, not smoking, drinking less alcohol, and treating other health problems, such as diabetes, high blood pressure, or high cholesterol.  · Do not use any products that contain nicotine or tobacco, such as cigarettes and e-cigarettes.  · Get help right away if you have any signs or symptoms of a stroke.  This information is not intended to replace advice given to you by your health care provider. Make sure you discuss any questions you have with your health care provider.  Document Released: 06/18/2013 Document Revised: 02/13/2020 Document Reviewed: 03/21/2018  Elsevier Patient Education © 2020 [employer]    Stroke " Prevention  Some medical conditions and lifestyle choices can lead to a higher risk for a stroke. You can help to prevent a stroke by making nutrition, lifestyle, and other changes.  What nutrition changes can be made?    · Eat healthy foods.  ? Choose foods that are high in fiber. These include:  § Fresh fruits.  § Fresh vegetables.  § Whole grains.  ? Eat at least 5 or more servings of fruits and vegetables each day. Try to fill half of your plate at each meal with fruits and vegetables.  ? Choose lean protein foods. These include:  § Lowfat (lean) cuts of meat.  § Chicken without skin.  § Fish.  § Tofu.  § Beans.  § Nuts.  ? Eat low-fat dairy products.  ? Avoid foods that:  § Are high in salt (sodium).  § Have saturated fat.  § Have trans fat.  § Have cholesterol.  § Are processed.  § Are premade.  · Follow eating guidelines as told by your doctor. These may include:  ? Reducing how many calories you eat and drink each day.  ? Limiting how much salt you eat or drink each day to 1,500 milligrams (mg).  ? Using only healthy fats for cooking. These include:  § Olive oil.  § Canola oil.  § Sunflower oil.  ? Counting how many carbohydrates you eat and drink each day.  What lifestyle changes can be made?  · Try to stay at a healthy weight. Talk to your doctor about what a good weight is for you.  · Get at least 30 minutes of moderate physical activity at least 5 days a week. This can include:  ? Fast walking.  ? Biking.  ? Swimming.  · Do not use any products that have nicotine or tobacco. This includes cigarettes and e-cigarettes. If you need help quitting, ask your doctor. Avoid being around tobacco smoke in general.  · Limit how much alcohol you drink to no more than 1 drink a day for nonpregnant women and 2 drinks a day for men. One drink equals 12 oz of beer, 5 oz of wine, or 1½ oz of hard liquor.  · Do not use drugs.  · Avoid taking birth control pills. Talk to your doctor about the risks of taking birth control  "pills if:  ? You are over 35 years old.  ? You smoke.  ? You get migraines.  ? You have had a blood clot.  What other changes can be made?  · Manage your cholesterol.  ? It is important to eat a healthy diet.  ? If your cholesterol cannot be managed through your diet, you may also need to take medicines. Take medicines as told by your doctor.  · Manage your diabetes.  ? It is important to eat a healthy diet and to exercise regularly.  ? If your blood sugar cannot be managed through diet and exercise, you may need to take medicines. Take medicines as told by your doctor.  · Control your high blood pressure (hypertension).  ? Try to keep your blood pressure below 130/80. This can help lower your risk of stroke.  ? It is important to eat a healthy diet and to exercise regularly.  ? If your blood pressure cannot be managed through diet and exercise, you may need to take medicines. Take medicines as told by your doctor.  ? Ask your doctor if you should check your blood pressure at home.  ? Have your blood pressure checked every year. Do this even if your blood pressure is normal.  · Talk to your doctor about getting checked for a sleep disorder. Signs of this can include:  ? Snoring a lot.  ? Feeling very tired.  · Take over-the-counter and prescription medicines only as told by your doctor. These may include aspirin or blood thinners (antiplatelets or anticoagulants).  · Make sure that any other medical conditions you have are managed.  Where to find more information  · American Stroke Association: www.strokeassociation.org  · National Stroke Association: www.stroke.org  Get help right away if:  · You have any symptoms of stroke. \"BE FAST\" is an easy way to remember the main warning signs:  ? B - Balance. Signs are dizziness, sudden trouble walking, or loss of balance.  ? E - Eyes. Signs are trouble seeing or a sudden change in how you see.  ? F - Face. Signs are sudden weakness or loss of feeling of the face, or the " face or eyelid drooping on one side.  ? A - Arms. Signs are weakness or loss of feeling in an arm. This happens suddenly and usually on one side of the body.  ? S - Speech. Signs are sudden trouble speaking, slurred speech, or trouble understanding what people say.  ? T - Time. Time to call emergency services. Write down what time symptoms started.  · You have other signs of stroke, such as:  ? A sudden, very bad headache with no known cause.  ? Feeling sick to your stomach (nausea).  ? Throwing up (vomiting).  ? Jerky movements you cannot control (seizure).  These symptoms may represent a serious problem that is an emergency. Do not wait to see if the symptoms will go away. Get medical help right away. Call your local emergency services (911 in the [place] Do not drive yourself to the hospital.  Summary  · You can prevent a stroke by eating healthy, exercising, not smoking, drinking less alcohol, and treating other health problems, such as diabetes, high blood pressure, or high cholesterol.  · Do not use any products that contain nicotine or tobacco, such as cigarettes and e-cigarettes.  · Get help right away if you have any signs or symptoms of a stroke.  This information is not intended to replace advice given to you by your health care provider. Make sure you discuss any questions you have with your health care provider.  Document Released: 06/18/2013 Document Revised: 02/13/2020 Document Reviewed: 03/21/2018  Elsevier Patient Education © 2020 [employer]

## 2020-07-13 NOTE — PROGRESS NOTES
Subjective:    HPI    Tyala Molina is a 80 y.o. female who presents to the [facility] for evaluation of right brainstem infarct.    She presented to [facility] on 6/24/2020 with left sided weakness, left facial numbness and dizziness.  Her symptoms went away completely while she was in the hospital.    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    Had been on Plavix on Aspirin for stroke prevention per neurologist (Dr. Valencia)      History also includes migraines (currently has 5-6 migraines per month).    Review of Systems   Constitutional: Negative for chills and fever.   HENT: Positive for hearing loss and tinnitus.    Eyes: Negative for blurred vision and double vision.   Respiratory: Negative for cough and shortness of breath.    Cardiovascular: Positive for chest pain.        Had chest pain a couple of days before upon awakening, lasted about 10 minutes   Gastrointestinal: Negative for heartburn.   Genitourinary: Negative for dysuria.   Musculoskeletal: Positive for joint pain and neck pain. Negative for myalgias.   Skin: Negative for rash.   Neurological: Positive for sensory change and headaches. Negative for seizures.   Endo/Heme/Allergies: Bruises/bleeds easily.   Psychiatric/Behavioral: Negative for depression. The patient is not nervous/anxious.        Past Medical History:   Diagnosis Date   • Hypothyroidism    • Stroke (HCC)      Current Outpatient Medications on File Prior to Visit   Medication Sig Dispense Refill   • Biotin 5000 MCG Cap Take 5,000 mcg by mouth every day.     • aspirin EC (ECOTRIN) 81 MG Tablet Delayed Response Take 81 mg by mouth every day.     • B Complex Vitamins (B COMPLEX PO) Take 1 Tab by mouth every day.     • multivitamin  "(THERAGRAN) Tab Take 1 Tab by mouth every day.     • Calcium Carbonate (CALCIUM 600 PO) Take 600 mg by mouth every day.     • atorvastatin (LIPITOR) 20 MG Tab Take 20 mg by mouth every day.     • clopidogrel (PLAVIX) 75 MG Tab Take 75 mg by mouth every day.     • TURMERIC PO Take 500 mg by mouth every day.     • Tofacitinib Citrate (XELJANZ) 5 MG Tab Take 10 mg by mouth every day.     • topiramate (TOPAMAX) 25 MG Tab Take 50 mg by mouth 2 times a day.     • levothyroxine (SYNTHROID) 100 MCG Tab Take 100 mcg by mouth Every morning on an empty stomach.     • trazodone (DESYREL) 50 MG Tab Take 50 mg by mouth every evening.     • celecoxib (CELEBREX) 200 MG Cap Take 200 mg by mouth every 48 hours.       No current facility-administered medications on file prior to visit.         Objective:     MRI brain 6/24/2020:  1.  Old lacunar size infarct right thalamus.  2.  Minimal supratentorial white matter disease most consistent with microvascular ischemic change. Common findings for the patient's age.  3.  Subtle subcentimeter focus of T2 hyperintensity in the right paramedian roberta without corresponding restricted diffusion. Most likely chronic microvascular ischemic change. 4.  No evidence of acute hemorrhage or mass lesion.  CTA neck  1.  Mild atherosclerotic narrowing of the carotid bulb/proximal internal carotid arteries bilaterally.  2.  No focal high-grade stenosis, dissection or occlusion of the cervical carotid or vertebral arteries.  CTA head  No intracranial aneurysm, focal stenosis or abrupt large vessel cut off.    TTE:  LVEF 65%, LA size WNL, USMAN 26    Labs:   A1C 5.0, LDL 81, Cr 0.77.    Encounter Vitals  Standard Vitals  Vitals  Blood Pressure : 116/58  Temperature: 36 °C (96.8 °F)  Temp src: Temporal  Pulse: 78  Respiration: 16  Pulse Oximetry: 96 %  Height: 154.9 cm (5' 1\")  Weight: 69 kg (152 lb 1.9 oz)  Encounter Vitals  Temperature: 36 °C (96.8 °F)  Temp src: Temporal  Blood Pressure : 116/58  Pulse: " "78  Respiration: 16  Pulse Oximetry: 96 %  Weight: 69 kg (152 lb 1.9 oz)  Height: 154.9 cm (5' 1\")  BMI (Calculated): 28.74        Physical Exam      General:  Alert, no apparent distress,  Psych:   mood and affect WNL  Neuro:   Oriented X 4, speech fluent, naming and memory intact                 cranial nerves II-XII intact                 Strength 5/5 BUE/BLE, no drift                  Sensation to PP decreased LUE/LLE                 No limb ataxia with finger to nose and heel to shin                 Ambulates with steady gait.                Biceps,brachioradialis, tricep, patellar and ankle reflex all 2+    Cardiovascular:    S1S2, no abnormal rhythm auscultated, no peripheral edema  Neck:                     No carotid bruits noted   Pulmonary:            Respirations easy, lungs clear to auscultation all fields.    Skin:                     No obvious rashes.        Current NIHSS    1a. LOC: 0  1b. LOC Questions: 0  1c. LOC Commands: 0  2. Best Gaze:0  3. Visual Fields: 0  4. Facial Paresis: 0  5a. Motor arm left: 0  5b. Motor arm right: 0  6a. Motor leg left: 0  6b Motor leg right 0   7. Sensory: 1  8. Best Language: 0  9. Limb Ataxia: 0  10. Dysarthria: 0  11. Extinction/Inattention: 0    Total Score Current  1    Current mRS  1         Assessment/Plan:       1. History of stroke,  Symptoms 6/24/2020 likely represent recrudescence., less likely TIA as her symptoms lasted over 6 hours and the MRI did not show any new infarct and blood pressure quickly decreased from 153 to 114 systolic.  Consider history of migraines, cannot rule out complicated migraine.    Presumed mechanism by TOAST:  __Large Artery Atherosclerosis  XX Small Vessel (Lacunar)  __Cardioembolic  __Other (Sickle Cell, Vasculitis, Hypercoagulable)  __Unknown  __ESUS    Continue ASA and plavix per Dr. Valencia, discussed signs of bleeding, no indication for DAPT stop Plavix when current bottle is empty (she has about 2 weeks left in her " bottle)      LDL 81, goal < 70, stop Lipitor 20mg and start Crestor 20mg,  discussed side effects, will need follow up with PCP for refills and to monitor liver function.    Blood pressure goal < 130/80      Follow up PRN.    May follow up in my clinic for headaches.

## 2020-08-19 ENCOUNTER — OFFICE VISIT (OUTPATIENT)
Dept: MEDICAL GROUP | Age: 80
End: 2020-08-19
Payer: COMMERCIAL

## 2020-08-19 VITALS
HEIGHT: 61 IN | HEART RATE: 90 BPM | TEMPERATURE: 98.4 F | DIASTOLIC BLOOD PRESSURE: 54 MMHG | BODY MASS INDEX: 28.92 KG/M2 | WEIGHT: 153.2 LBS | SYSTOLIC BLOOD PRESSURE: 100 MMHG | OXYGEN SATURATION: 95 %

## 2020-08-19 DIAGNOSIS — G43.109 MIGRAINE WITH AURA AND WITHOUT STATUS MIGRAINOSUS, NOT INTRACTABLE: ICD-10-CM

## 2020-08-19 DIAGNOSIS — M54.41 CHRONIC BILATERAL LOW BACK PAIN WITH BILATERAL SCIATICA: ICD-10-CM

## 2020-08-19 DIAGNOSIS — E03.4 HYPOTHYROIDISM DUE TO ACQUIRED ATROPHY OF THYROID: ICD-10-CM

## 2020-08-19 DIAGNOSIS — M54.42 CHRONIC BILATERAL LOW BACK PAIN WITH BILATERAL SCIATICA: ICD-10-CM

## 2020-08-19 DIAGNOSIS — E53.8 VITAMIN B 12 DEFICIENCY: ICD-10-CM

## 2020-08-19 DIAGNOSIS — G89.29 CHRONIC BILATERAL LOW BACK PAIN WITH BILATERAL SCIATICA: ICD-10-CM

## 2020-08-19 DIAGNOSIS — E55.9 VITAMIN D DEFICIENCY: ICD-10-CM

## 2020-08-19 DIAGNOSIS — F51.01 PRIMARY INSOMNIA: ICD-10-CM

## 2020-08-19 DIAGNOSIS — E78.5 DYSLIPIDEMIA: ICD-10-CM

## 2020-08-19 DIAGNOSIS — M05.79 RHEUMATOID ARTHRITIS INVOLVING MULTIPLE SITES WITH POSITIVE RHEUMATOID FACTOR (HCC): ICD-10-CM

## 2020-08-19 PROCEDURE — 99204 OFFICE O/P NEW MOD 45 MIN: CPT | Performed by: INTERNAL MEDICINE

## 2020-08-19 RX ORDER — PREGABALIN 50 MG/1
CAPSULE ORAL
COMMUNITY
Start: 2020-08-18 | End: 2020-08-19 | Stop reason: SDUPTHER

## 2020-08-19 RX ORDER — TOFACITINIB 5 MG/1
10 TABLET, FILM COATED ORAL 2 TIMES DAILY
Qty: 60 TAB | Refills: 11 | Status: SHIPPED | DISCHARGE
Start: 2020-08-19

## 2020-08-19 RX ORDER — ROSUVASTATIN CALCIUM 20 MG/1
20 TABLET, COATED ORAL EVERY EVENING
Qty: 90 TAB | Refills: 3 | Status: SHIPPED | DISCHARGE
Start: 2020-08-19 | End: 2021-08-19

## 2020-08-19 RX ORDER — CELECOXIB 200 MG/1
200 CAPSULE ORAL
Qty: 60 CAP | Refills: 3 | Status: SHIPPED | DISCHARGE
Start: 2020-08-19 | End: 2021-02-18

## 2020-08-19 RX ORDER — TOPIRAMATE 25 MG/1
50 TABLET ORAL 2 TIMES DAILY
Qty: 60 TAB | Refills: 11 | Status: SHIPPED | DISCHARGE
Start: 2020-08-19

## 2020-08-19 RX ORDER — TRAZODONE HYDROCHLORIDE 50 MG/1
50 TABLET ORAL
Qty: 90 TAB | Refills: 4 | Status: SHIPPED | DISCHARGE
Start: 2020-08-19

## 2020-08-19 RX ORDER — PREGABALIN 50 MG/1
50 CAPSULE ORAL 2 TIMES DAILY
Qty: 60 CAP | Refills: 5 | Status: SHIPPED | DISCHARGE
Start: 2020-08-19 | End: 2020-09-02

## 2020-08-19 RX ORDER — LEVOTHYROXINE SODIUM 0.1 MG/1
100 TABLET ORAL
Qty: 90 TAB | Refills: 4 | Status: SHIPPED | DISCHARGE
Start: 2020-08-19

## 2020-08-19 ASSESSMENT — ENCOUNTER SYMPTOMS
PSYCHIATRIC NEGATIVE: 1
MUSCULOSKELETAL NEGATIVE: 1
EYES NEGATIVE: 1
CONSTITUTIONAL NEGATIVE: 1
CARDIOVASCULAR NEGATIVE: 1
NEUROLOGICAL NEGATIVE: 1
GASTROINTESTINAL NEGATIVE: 1
RESPIRATORY NEGATIVE: 1

## 2020-08-19 ASSESSMENT — FIBROSIS 4 INDEX: FIB4 SCORE: 2.04

## 2020-08-20 NOTE — PROGRESS NOTES
Subjective:   This is a new patient to me unable to get established with a primary care physician for the last several years renal.  Finally referred by family member.  She has a very complicated past medical history with multiple procedures and illnesses and ongoing problems including  Tayla Molina is a 80 y.o. female who presents with Establish Care  Patient has no new problems or issues today other than refill of her medications.  She has not had blood work done for the last year and needs follow-up on her hypothyroidism replacement therapy.    Extensive time was taken to obtain patient's past medical history as well as surgical history which had not been filled in in the chart.  Past Medical History:   Diagnosis Date   • Arthritis     rheumatoid- dr allen   • Hypothyroidism    • Osteoporosis    • Stroke (HCC)       Surgical History Collapse by Default   Collapse by Default        2012 Laminotomy lumbar L5   2004 Gastric bypass laparoscopic (N/A)   2004 Abdominal exploration gastric bypass   2002 Lisha by laparoscopy   1974 Mastectomy (Bilateral[place]; no cancer (multiple benign tumor)   1972 Vaginal hysterectomy total without oophorectoomies   Date Unknown Dental extraction(s)   Date Unknown Primary c section       Patient Active Problem List    Diagnosis Date Noted   • Left-sided weakness      Priority: High   • History of stroke      Priority: Medium   • Acquired hypothyroidism 06/24/2020     Priority: Low   • Rheumatoid arthritis involving multiple sites with positive rheumatoid factor (HCC) 08/19/2020   • Migraine with aura and without status migrainosus, not intractable 08/19/2020   • Hypothyroidism due to acquired atrophy of thyroid 08/19/2020   • Primary insomnia 08/19/2020   • Chronic bilateral low back pain with bilateral sciatica 08/19/2020   • Vitamin D deficiency 08/19/2020   • Vitamin B 12 deficiency 08/19/2020   • Dyslipidemia 08/19/2020     Allergies   Allergen Reactions   •  Codeine Vomiting     Itching   • Food Anaphylaxis     Kiwi     No surgery found     No visits with results within 1 Month(s) from this visit.   Latest known visit with results is:   Admission on 2020, Discharged on 2020   Component Date Value   • Report 2020                      Value:[facility] Emergency Dept.    Test Date:  2020  Pt Name:    ELBERT ASHLEY                Department: [facility]  MRN:        [id number]                      [facility]  Gender:     Female                       Technician: DEEPTHI  :        [id number]                   Requested By:XIN LYON  Order #:    [id number]                    Reading MD: XIN LYON MD    Measurements  Intervals                                Axis  Rate:       79                           P:          -39  UT:         169                          QRS:        49  QRSD:       115                          T:          44  QT:         390  QTc:        448    Interpretive Statements  Sinus rhythm  Nonspecific intraventricular conduction delay  Abnormal inferior Q waves  No previous ECG available for comparison  Electronically Signed On 2020 14:58:33 PDT by XIN LYON MD     • WBC 2020 6.3    • RBC 2020 4.16*   • Hemoglobin 2020 13.4    • Hematocrit 2020 41.1    • MCV 2020 98.8*   • MCH 2020 32.2    • MCHC 2020 32.6*   • RDW 2020 46.1    • Platelet Count 2020 238    • MPV 2020 9.8    • Neutrophils-Polys 2020 66.40    • Lymphocytes 2020 24.10    • Monocytes 2020 7.30    • Eosinophils 2020 1.60    • Basophils 2020 0.30    • Immature Granulocytes 2020 0.30    • Nucleated RBC 2020 0.00    • Neutrophils (Absolute) 2020 4.15    • Lymphs (Absolute) 2020 1.51    • Monos (Absolute) 2020 0.46    • Eos (Absolute) 2020 0.10    • Baso (Absolute) 2020 0.02    • Immature Granulocytes (a*  06/24/2020 0.02    • NRBC (Absolute) 06/24/2020 0.00    • Sodium 06/24/2020 140    • Potassium 06/24/2020 3.7    • Chloride 06/24/2020 108    • Co2 06/24/2020 23    • Anion Gap 06/24/2020 9.0    • Glucose 06/24/2020 91    • Bun 06/24/2020 16    • Creatinine 06/24/2020 0.80    • Calcium 06/24/2020 9.3    • AST(SGOT) 06/24/2020 27    • ALT(SGPT) 06/24/2020 22    • Alkaline Phosphatase 06/24/2020 58    • Total Bilirubin 06/24/2020 0.2    • Albumin 06/24/2020 4.0    • Total Protein 06/24/2020 6.4    • Globulin 06/24/2020 2.4    • A-G Ratio 06/24/2020 1.7    • PT 06/24/2020 12.3    • INR 06/24/2020 0.91    • APTT 06/24/2020 29.0    • ABO Grouping Only 06/24/2020 A    • Rh Grouping Only 06/24/2020 POS    • Antibody Screen-Cod 06/24/2020 NEG    • Troponin T 06/24/2020 <6    • ABO Rh Confirm 06/24/2020 A POS    • GFR If  06/24/2020 >60    • GFR If Non  Ameri* 06/24/2020 >60    • Glucose - Accu-Ck 06/24/2020 95    • Glycohemoglobin 06/24/2020 5.0    • Est Avg Glucose 06/24/2020 97    • Reaction Time Initial-R 06/24/2020 3.5*   • Clot Kinetics-K 06/24/2020 1.4    • Clot Angle-Angle 06/24/2020 70.9    • Maximum Clot Strength-MA 06/24/2020 68.1    • Lysis 30 minutes-LY30 06/24/2020 0.0    • TEG Algorithm 06/24/2020 Link Algorithm    • WBC 06/25/2020 4.5*   • RBC 06/25/2020 3.80*   • Hemoglobin 06/25/2020 12.4    • Hematocrit 06/25/2020 38.3    • MCV 06/25/2020 100.8*   • MCH 06/25/2020 32.6    • MCHC 06/25/2020 32.4*   • RDW 06/25/2020 47.6    • Platelet Count 06/25/2020 223    • MPV 06/25/2020 10.1    • Neutrophils-Polys 06/25/2020 62.30    • Lymphocytes 06/25/2020 27.70    • Monocytes 06/25/2020 7.60    • Eosinophils 06/25/2020 2.00    • Basophils 06/25/2020 0.40    • Immature Granulocytes 06/25/2020 0.00    • Nucleated RBC 06/25/2020 0.00    • Neutrophils (Absolute) 06/25/2020 2.78    • Lymphs (Absolute) 06/25/2020 1.24    • Monos (Absolute) 06/25/2020 0.34    • Eos (Absolute) 06/25/2020 0.09    • Baso  (Absolute) 06/25/2020 0.02    • Immature Granulocytes (a* 06/25/2020 0.00    • NRBC (Absolute) 06/25/2020 0.00    • Sodium 06/25/2020 142    • Potassium 06/25/2020 4.0    • Chloride 06/25/2020 111    • Co2 06/25/2020 22    • Anion Gap 06/25/2020 9.0    • Glucose 06/25/2020 90    • Bun 06/25/2020 14    • Creatinine 06/25/2020 0.77    • Calcium 06/25/2020 8.8    • AST(SGOT) 06/25/2020 24    • ALT(SGPT) 06/25/2020 17    • Alkaline Phosphatase 06/25/2020 47    • Total Bilirubin 06/25/2020 0.3    • Albumin 06/25/2020 3.5    • Total Protein 06/25/2020 5.8*   • Globulin 06/25/2020 2.3    • A-G Ratio 06/25/2020 1.5    • Magnesium 06/25/2020 1.9    • Cholesterol,Tot 06/25/2020 165    • Triglycerides 06/25/2020 83    • HDL 06/25/2020 67    • LDL 06/25/2020 81    • GFR If  06/25/2020 >60    • GFR If Non  Ameri* 06/25/2020 >60    • Eject.Frac. MOD BP 06/25/2020 72.38    • Eject.Frac. MOD 4C 06/25/2020 71.67    • Eject.Frac. MOD 2C 06/25/2020 72.94    • Left Ventrical Ejection * 06/25/2020 65       Lab Results   Component Value Date/Time    HBA1C 5.0 06/24/2020 03:59 PM     Lab Results   Component Value Date/Time    SODIUM 142 06/25/2020 04:56 AM    POTASSIUM 4.0 06/25/2020 04:56 AM    CHLORIDE 111 06/25/2020 04:56 AM    CO2 22 06/25/2020 04:56 AM    GLUCOSE 90 06/25/2020 04:56 AM    BUN 14 06/25/2020 04:56 AM    CREATININE 0.77 06/25/2020 04:56 AM    ALKPHOSPHAT 47 06/25/2020 04:56 AM    ASTSGOT 24 06/25/2020 04:56 AM    ALTSGPT 17 06/25/2020 04:56 AM    TBILIRUBIN 0.3 06/25/2020 04:56 AM     Lab Results   Component Value Date/Time    INR 0.91 06/24/2020 01:15 PM     Lab Results   Component Value Date/Time    CHOLSTRLTOT 165 06/25/2020 04:56 AM    LDL 81 06/25/2020 04:56 AM    HDL 67 06/25/2020 04:56 AM    TRIGLYCERIDE 83 06/25/2020 04:56 AM       No results found for: TESTOSTERONE  No results found for: TSH  No results found for: FREET4  No results found for: URICACID  No components found for: VITB12  No  "results found for: 25HYDROXY              HPI    Review of Systems   Constitutional: Negative.    HENT: Negative.    Eyes: Negative.    Respiratory: Negative.    Cardiovascular: Negative.    Gastrointestinal: Negative.    Genitourinary: Negative.    Musculoskeletal: Negative.    Skin: Negative.    Neurological: Negative.    Endo/Heme/Allergies: Negative.    Psychiatric/Behavioral: Negative.           Objective:     /54 (BP Location: Right arm, Patient Position: Sitting, BP Cuff Size: Adult)   Pulse 90   Temp 36.9 °C (98.4 °F) (Temporal)   Ht 1.549 m (5' 1\")   Wt 69.5 kg (153 lb 3.2 oz)   SpO2 95%   BMI 28.95 kg/m²      Physical Exam  Vitals signs reviewed.   Constitutional:       General: She is not in acute distress.     Appearance: She is well-developed. She is not diaphoretic.   HENT:      Head: Normocephalic and atraumatic.      Right Ear: External ear normal.      Left Ear: External ear normal.      Nose: Nose normal.      Mouth/Throat:      Pharynx: No oropharyngeal exudate.   Eyes:      General: No scleral icterus.        Right eye: No discharge.         Left eye: No discharge.      Conjunctiva/sclera: Conjunctivae normal.      Pupils: Pupils are equal, round, and reactive to light.   Neck:      Musculoskeletal: Normal range of motion and neck supple.      Thyroid: No thyromegaly.      Vascular: No JVD.      Trachea: No tracheal deviation.   Cardiovascular:      Rate and Rhythm: Normal rate and regular rhythm.      Heart sounds: Normal heart sounds. No murmur. No friction rub. No gallop.    Pulmonary:      Effort: Pulmonary effort is normal. No respiratory distress.      Breath sounds: Normal breath sounds. No stridor. No wheezing or rales.   Chest:      Chest wall: No tenderness.   Abdominal:      General: Bowel sounds are normal. There is no distension.      Palpations: Abdomen is soft. There is no mass.      Tenderness: There is no abdominal tenderness. There is no guarding or rebound. "   Musculoskeletal: Normal range of motion.         General: No tenderness.   Lymphadenopathy:      Cervical: No cervical adenopathy.   Skin:     General: Skin is warm and dry.      Coloration: Skin is not pale.      Findings: No erythema or rash.   Neurological:      Mental Status: She is alert and oriented to person, place, and time.      Cranial Nerves: No cranial nerve deficit.      Motor: No abnormal muscle tone.      Coordination: Coordination normal.      Deep Tendon Reflexes: Reflexes are normal and symmetric. Reflexes normal.   Psychiatric:         Behavior: Behavior normal.         Thought Content: Thought content normal.         Judgment: Judgment normal.                 Assessment/Plan:        1. Rheumatoid arthritis involving multiple sites with positive rheumatoid factor (HCC)  Ongoing chronic problem followed by Dr. Maki, her rheumatologist.  Continue on current regimen as it seems to controlling her pain quite well.  - Tofacitinib Citrate (XELJANZ) 5 MG Tab; Take 10 mg by mouth 2 Times a Day.  Dispense: 60 Tab; Refill: 11  - celecoxib (CELEBREX) 200 MG Cap; Take 1 Cap by mouth every 48 hours.  Dispense: 60 Cap; Refill: 3    2. Migraine with aura and without status migrainosus, not intractable     - topiramate (TOPAMAX) 25 MG Tab; Take 2 Tabs by mouth 2 times a day.  Dispense: 60 Tab; Refill: 11  - Sed Rate; Future    3. Hypothyroidism due to acquired atrophy of thyroid  -Needs follow-up with repeat labs.  Continue Synthroid at this dosage for now.:  - levothyroxine (SYNTHROID) 100 MCG Tab; Take 1 Tab by mouth Every morning on an empty stomach.  Dispense: 90 Tab; Refill: 4  - TSH; Future    4. Primary insomnia  -New problem recently started on trazodone by another primary care physician in [facility] with whom she wants to transfer this care to me.  We will refill her trazodone as needed.  But for now she is okay on her current supply.  - traZODone (DESYREL) 50 MG Tab; Take 1 Tab by mouth every bedtime.   Dispense: 90 Tab; Refill: 4    5. Chronic bilateral low back pain with bilateral sciatica  New problem.  Now seeing physical medicine doctor, Dr. Cooley who plans further work-up and imaging studies started her recently on Lyrica at low-dose and will gradually titrate upward to control the pain.  - pregabalin (LYRICA) 50 MG capsule; Take 1 Cap by mouth 2 times a day for 30 days.  Dispense: 60 Cap; Refill: 5  - Sed Rate; Future    6. Vitamin D deficiency-unknown control.  Recheck labs.  Continue current regimen     - VITAMIN D,25 HYDROXY; Future    7. Vitamin B 12 deficiency-unknown control.  Continue supplements.  Recheck labs     - VITAMIN B12; Future  - FOLATE; Future    8. Dyslipidemia-unknown control.  Recheck labs.  Continue current regimen        - TSH; Future  - Comp Metabolic Panel; Future  - Lipid Profile; Future  - CBC WITH DIFFERENTIAL; Future

## 2020-08-21 LAB
25(OH)D3+25(OH)D2 SERPL-MCNC: 46.2 NG/ML (ref 30–100)
ALBUMIN SERPL-MCNC: 4.3 G/DL (ref 3.7–4.7)
ALBUMIN/GLOB SERPL: 2.5 {RATIO} (ref 1.2–2.2)
ALP SERPL-CCNC: 55 IU/L (ref 39–117)
ALT SERPL-CCNC: 16 IU/L (ref 0–32)
AST SERPL-CCNC: 21 IU/L (ref 0–40)
BASOPHILS # BLD AUTO: 0 X10E3/UL (ref 0–0.2)
BASOPHILS NFR BLD AUTO: 0 %
BILIRUB SERPL-MCNC: 0.3 MG/DL (ref 0–1.2)
BUN SERPL-MCNC: 24 MG/DL (ref 8–27)
BUN/CREAT SERPL: 30 (ref 12–28)
CALCIUM SERPL-MCNC: 9.2 MG/DL (ref 8.7–10.3)
CHLORIDE SERPL-SCNC: 110 MMOL/L (ref 96–106)
CHOLEST SERPL-MCNC: 158 MG/DL (ref 100–199)
CO2 SERPL-SCNC: 19 MMOL/L (ref 20–29)
CREAT SERPL-MCNC: 0.81 MG/DL (ref 0.57–1)
EOSINOPHIL # BLD AUTO: 0 X10E3/UL (ref 0–0.4)
EOSINOPHIL NFR BLD AUTO: 0 %
ERYTHROCYTE [DISTWIDTH] IN BLOOD BY AUTOMATED COUNT: 11.8 % (ref 11.7–15.4)
ERYTHROCYTE [SEDIMENTATION RATE] IN BLOOD BY WESTERGREN METHOD: 7 MM/HR (ref 0–40)
FOLATE SERPL-MCNC: >20 NG/ML
GLOBULIN SER CALC-MCNC: 1.7 G/DL (ref 1.5–4.5)
GLUCOSE SERPL-MCNC: 90 MG/DL (ref 65–99)
HCT VFR BLD AUTO: 37.6 % (ref 34–46.6)
HDLC SERPL-MCNC: 68 MG/DL
HGB BLD-MCNC: 12.6 G/DL (ref 11.1–15.9)
IMM GRANULOCYTES # BLD AUTO: 0 X10E3/UL (ref 0–0.1)
IMM GRANULOCYTES NFR BLD AUTO: 0 %
IMMATURE CELLS  115398: ABNORMAL
LABORATORY COMMENT REPORT: NORMAL
LDLC SERPL CALC-MCNC: 75 MG/DL (ref 0–99)
LYMPHOCYTES # BLD AUTO: 1 X10E3/UL (ref 0.7–3.1)
LYMPHOCYTES NFR BLD AUTO: 10 %
MCH RBC QN AUTO: 32.8 PG (ref 26.6–33)
MCHC RBC AUTO-ENTMCNC: 33.5 G/DL (ref 31.5–35.7)
MCV RBC AUTO: 98 FL (ref 79–97)
MONOCYTES # BLD AUTO: 0.6 X10E3/UL (ref 0.1–0.9)
MONOCYTES NFR BLD AUTO: 6 %
MORPHOLOGY BLD-IMP: ABNORMAL
NEUTROPHILS # BLD AUTO: 8.5 X10E3/UL (ref 1.4–7)
NEUTROPHILS NFR BLD AUTO: 84 %
NRBC BLD AUTO-RTO: ABNORMAL %
PLATELET # BLD AUTO: 237 X10E3/UL (ref 150–450)
POTASSIUM SERPL-SCNC: 4 MMOL/L (ref 3.5–5.2)
PROT SERPL-MCNC: 6 G/DL (ref 6–8.5)
RBC # BLD AUTO: 3.84 X10E6/UL (ref 3.77–5.28)
SODIUM SERPL-SCNC: 143 MMOL/L (ref 134–144)
TRIGL SERPL-MCNC: 77 MG/DL (ref 0–149)
TSH SERPL DL<=0.005 MIU/L-ACNC: 0.06 UIU/ML (ref 0.45–4.5)
VIT B12 SERPL-MCNC: 724 PG/ML (ref 232–1245)
VLDLC SERPL CALC-MCNC: 15 MG/DL (ref 5–40)
WBC # BLD AUTO: 10.1 X10E3/UL (ref 3.4–10.8)

## 2020-09-02 PROBLEM — E03.9 ACQUIRED HYPOTHYROIDISM: Status: RESOLVED | Noted: 2020-06-24 | Resolved: 2020-09-02

## 2021-01-11 DIAGNOSIS — Z23 NEED FOR VACCINATION: ICD-10-CM

## 2021-02-18 ENCOUNTER — PRE-ADMISSION TESTING (OUTPATIENT)
Dept: ADMISSIONS | Facility: MEDICAL CENTER | Age: 81
End: 2021-02-18
Attending: ORTHOPAEDIC SURGERY
Payer: COMMERCIAL

## 2021-02-18 DIAGNOSIS — Z01.812 PRE-OPERATIVE LABORATORY EXAMINATION: ICD-10-CM

## 2021-02-18 DIAGNOSIS — Z01.810 PRE-OPERATIVE CARDIOVASCULAR EXAMINATION: ICD-10-CM

## 2021-02-18 LAB
ANION GAP SERPL CALC-SCNC: 10 MMOL/L (ref 7–16)
BUN SERPL-MCNC: 18 MG/DL (ref 8–22)
CALCIUM SERPL-MCNC: 9.2 MG/DL (ref 8.4–10.2)
CHLORIDE SERPL-SCNC: 107 MMOL/L (ref 96–112)
CO2 SERPL-SCNC: 24 MMOL/L (ref 20–33)
CREAT SERPL-MCNC: 0.71 MG/DL (ref 0.5–1.4)
EKG IMPRESSION: NORMAL
ERYTHROCYTE [DISTWIDTH] IN BLOOD BY AUTOMATED COUNT: 44 FL (ref 35.9–50)
GLUCOSE SERPL-MCNC: 100 MG/DL (ref 65–99)
HCT VFR BLD AUTO: 39.8 % (ref 37–47)
HGB BLD-MCNC: 12.9 G/DL (ref 12–16)
MCH RBC QN AUTO: 31.5 PG (ref 27–33)
MCHC RBC AUTO-ENTMCNC: 32.4 G/DL (ref 33.6–35)
MCV RBC AUTO: 97.3 FL (ref 81.4–97.8)
PLATELET # BLD AUTO: 239 K/UL (ref 164–446)
PMV BLD AUTO: 10.6 FL (ref 9–12.9)
POTASSIUM SERPL-SCNC: 4 MMOL/L (ref 3.6–5.5)
RBC # BLD AUTO: 4.09 M/UL (ref 4.2–5.4)
SCCMEC + MECA PNL NOSE NAA+PROBE: NEGATIVE
SCCMEC + MECA PNL NOSE NAA+PROBE: NEGATIVE
SODIUM SERPL-SCNC: 141 MMOL/L (ref 135–145)
WBC # BLD AUTO: 6.4 K/UL (ref 4.8–10.8)

## 2021-02-18 PROCEDURE — 93005 ELECTROCARDIOGRAM TRACING: CPT

## 2021-02-18 PROCEDURE — 87640 STAPH A DNA AMP PROBE: CPT | Mod: XU

## 2021-02-18 PROCEDURE — 87641 MR-STAPH DNA AMP PROBE: CPT

## 2021-02-18 PROCEDURE — 93010 ELECTROCARDIOGRAM REPORT: CPT | Performed by: INTERNAL MEDICINE

## 2021-02-18 PROCEDURE — 80048 BASIC METABOLIC PNL TOTAL CA: CPT

## 2021-02-18 PROCEDURE — 85027 COMPLETE CBC AUTOMATED: CPT

## 2021-02-18 RX ORDER — FAMCICLOVIR 250 MG/1
TABLET ORAL
COMMUNITY
Start: 2021-02-10

## 2021-02-18 RX ORDER — RIBOFLAVIN (VITAMIN B2) 100 MG
400 TABLET ORAL DAILY
COMMUNITY

## 2021-02-18 ASSESSMENT — FIBROSIS 4 INDEX: FIB4 SCORE: 1.75

## 2021-02-18 NOTE — DISCHARGE PLANNING
DISCHARGE PLANNING NOTE - TOTAL JOINT    Procedure: Procedure(s):  ARTHROPLASTY, KNEE, TOTAL  Procedure Date: 3/4/2021  Insurance: Payor: [employer] / Plan: [facility] MEDICARE ADVANTAGE / Product Type: *No Product type* /    Equipment currently available at home?  shower chair and ice packs.  Steps into the home? 3  Steps within the home? 0  Toilet height? Standard  Type of shower? walk-in shower  Who will be with you during your recovery? Daughter whom pt lives with  Is Outpatient Physical Therapy set up after surgery? Yes  Did you take the Total Joint Class and where? Yes  Planning same day discharge?No     This writer met with pt during her preadmission appointment. Pt states she has all needed equipment except Front-Wheel Walker. Choice form reviewed with patient and scanned into [employer]. Home safety checklist reviewed and copy given to pt. Pt educated to dc criteria. All questions answered and verbalizes understanding of all instructions. No dc needs identified at this time. Anticipate dc to home without barriers.

## 2021-02-18 NOTE — PREPROCEDURE INSTRUCTIONS
Preadmit instructions and anesthesia fasting instructions given. Any patient questions addressed. Pt instructed to take these medications on day of surgery: synthroid, topamax. Pt instructed to self-isolate after covid test on 3/1. Total joint info and supplies given to pt and she spoke with .

## 2021-03-01 ENCOUNTER — PRE-ADMISSION TESTING (OUTPATIENT)
Dept: ADMISSIONS | Facility: MEDICAL CENTER | Age: 81
End: 2021-03-01
Attending: ORTHOPAEDIC SURGERY
Payer: COMMERCIAL

## 2021-03-01 DIAGNOSIS — Z01.812 PRE-OPERATIVE LABORATORY EXAMINATION: ICD-10-CM

## 2021-03-01 LAB
COVID ORDER STATUS COVID19: NORMAL
SARS-COV-2 RNA RESP QL NAA+PROBE: NOTDETECTED
SPECIMEN SOURCE: NORMAL

## 2021-03-01 PROCEDURE — U0003 INFECTIOUS AGENT DETECTION BY NUCLEIC ACID (DNA OR RNA); SEVERE ACUTE RESPIRATORY SYNDROME CORONAVIRUS 2 (SARS-COV-2) (CORONAVIRUS DISEASE [COVID-19]), AMPLIFIED PROBE TECHNIQUE, MAKING USE OF HIGH THROUGHPUT TECHNOLOGIES AS DESCRIBED BY CMS-2020-01-R: HCPCS

## 2021-03-01 PROCEDURE — U0005 INFEC AGEN DETEC AMPLI PROBE: HCPCS

## 2021-03-01 PROCEDURE — C9803 HOPD COVID-19 SPEC COLLECT: HCPCS

## 2021-03-03 ENCOUNTER — ANESTHESIA EVENT (OUTPATIENT)
Dept: SURGERY | Facility: MEDICAL CENTER | Age: 81
End: 2021-03-03
Payer: COMMERCIAL

## 2021-03-04 ENCOUNTER — APPOINTMENT (OUTPATIENT)
Dept: RADIOLOGY | Facility: MEDICAL CENTER | Age: 81
End: 2021-03-04
Attending: ORTHOPAEDIC SURGERY
Payer: COMMERCIAL

## 2021-03-04 ENCOUNTER — HOSPITAL ENCOUNTER (OUTPATIENT)
Facility: MEDICAL CENTER | Age: 81
End: 2021-03-04
Attending: ORTHOPAEDIC SURGERY | Admitting: ORTHOPAEDIC SURGERY
Payer: COMMERCIAL

## 2021-03-04 ENCOUNTER — ANESTHESIA (OUTPATIENT)
Dept: SURGERY | Facility: MEDICAL CENTER | Age: 81
End: 2021-03-04
Payer: COMMERCIAL

## 2021-03-04 VITALS
DIASTOLIC BLOOD PRESSURE: 62 MMHG | HEART RATE: 72 BPM | BODY MASS INDEX: 28.01 KG/M2 | OXYGEN SATURATION: 100 % | WEIGHT: 148.37 LBS | TEMPERATURE: 97.5 F | HEIGHT: 61 IN | RESPIRATION RATE: 16 BRPM | SYSTOLIC BLOOD PRESSURE: 128 MMHG

## 2021-03-04 DIAGNOSIS — G43.109 MIGRAINE WITH AURA AND WITHOUT STATUS MIGRAINOSUS, NOT INTRACTABLE: ICD-10-CM

## 2021-03-04 DIAGNOSIS — R53.1 LEFT-SIDED WEAKNESS: ICD-10-CM

## 2021-03-04 DIAGNOSIS — M54.42 CHRONIC BILATERAL LOW BACK PAIN WITH BILATERAL SCIATICA: ICD-10-CM

## 2021-03-04 DIAGNOSIS — F51.01 PRIMARY INSOMNIA: ICD-10-CM

## 2021-03-04 DIAGNOSIS — M05.79 RHEUMATOID ARTHRITIS INVOLVING MULTIPLE SITES WITH POSITIVE RHEUMATOID FACTOR (HCC): ICD-10-CM

## 2021-03-04 DIAGNOSIS — M54.41 CHRONIC BILATERAL LOW BACK PAIN WITH BILATERAL SCIATICA: ICD-10-CM

## 2021-03-04 DIAGNOSIS — E78.5 DYSLIPIDEMIA: ICD-10-CM

## 2021-03-04 DIAGNOSIS — E53.8 VITAMIN B 12 DEFICIENCY: ICD-10-CM

## 2021-03-04 DIAGNOSIS — E55.9 VITAMIN D DEFICIENCY: ICD-10-CM

## 2021-03-04 DIAGNOSIS — Z86.73 HISTORY OF STROKE: ICD-10-CM

## 2021-03-04 DIAGNOSIS — Z96.652 HISTORY OF TOTAL LEFT KNEE REPLACEMENT: ICD-10-CM

## 2021-03-04 DIAGNOSIS — E03.4 HYPOTHYROIDISM DUE TO ACQUIRED ATROPHY OF THYROID: ICD-10-CM

## 2021-03-04 DIAGNOSIS — G89.29 CHRONIC BILATERAL LOW BACK PAIN WITH BILATERAL SCIATICA: ICD-10-CM

## 2021-03-04 PROCEDURE — 160041 HCHG SURGERY MINUTES - EA ADDL 1 MIN LEVEL 4: Performed by: ORTHOPAEDIC SURGERY

## 2021-03-04 PROCEDURE — 160002 HCHG RECOVERY MINUTES (STAT): Performed by: ORTHOPAEDIC SURGERY

## 2021-03-04 PROCEDURE — G0378 HOSPITAL OBSERVATION PER HR: HCPCS

## 2021-03-04 PROCEDURE — A9270 NON-COVERED ITEM OR SERVICE: HCPCS | Performed by: ORTHOPAEDIC SURGERY

## 2021-03-04 PROCEDURE — 501838 HCHG SUTURE GENERAL: Performed by: ORTHOPAEDIC SURGERY

## 2021-03-04 PROCEDURE — 502240 HCHG MISC OR SUPPLY RC 0272: Performed by: ORTHOPAEDIC SURGERY

## 2021-03-04 PROCEDURE — 160035 HCHG PACU - 1ST 60 MINS PHASE I: Performed by: ORTHOPAEDIC SURGERY

## 2021-03-04 PROCEDURE — 64447 NJX AA&/STRD FEMORAL NRV IMG: CPT | Performed by: ORTHOPAEDIC SURGERY

## 2021-03-04 PROCEDURE — 97165 OT EVAL LOW COMPLEX 30 MIN: CPT

## 2021-03-04 PROCEDURE — 700105 HCHG RX REV CODE 258: Performed by: ANESTHESIOLOGY

## 2021-03-04 PROCEDURE — 502579 HCHG PACK, TOTAL KNEE: Performed by: ORTHOPAEDIC SURGERY

## 2021-03-04 PROCEDURE — 700102 HCHG RX REV CODE 250 W/ 637 OVERRIDE(OP): Performed by: ANESTHESIOLOGY

## 2021-03-04 PROCEDURE — C1776 JOINT DEVICE (IMPLANTABLE): HCPCS | Performed by: ORTHOPAEDIC SURGERY

## 2021-03-04 PROCEDURE — A9270 NON-COVERED ITEM OR SERVICE: HCPCS | Performed by: ANESTHESIOLOGY

## 2021-03-04 PROCEDURE — 160048 HCHG OR STATISTICAL LEVEL 1-5: Performed by: ORTHOPAEDIC SURGERY

## 2021-03-04 PROCEDURE — 700102 HCHG RX REV CODE 250 W/ 637 OVERRIDE(OP): Performed by: ORTHOPAEDIC SURGERY

## 2021-03-04 PROCEDURE — 502000 HCHG MISC OR IMPLANTS RC 0278: Performed by: ORTHOPAEDIC SURGERY

## 2021-03-04 PROCEDURE — 160029 HCHG SURGERY MINUTES - 1ST 30 MINS LEVEL 4: Performed by: ORTHOPAEDIC SURGERY

## 2021-03-04 PROCEDURE — 700111 HCHG RX REV CODE 636 W/ 250 OVERRIDE (IP): Performed by: ANESTHESIOLOGY

## 2021-03-04 PROCEDURE — 700101 HCHG RX REV CODE 250: Performed by: ANESTHESIOLOGY

## 2021-03-04 PROCEDURE — L8699 PROSTHETIC IMPLANT NOS: HCPCS | Performed by: ORTHOPAEDIC SURGERY

## 2021-03-04 PROCEDURE — 160009 HCHG ANES TIME/MIN: Performed by: ORTHOPAEDIC SURGERY

## 2021-03-04 PROCEDURE — 97161 PT EVAL LOW COMPLEX 20 MIN: CPT

## 2021-03-04 PROCEDURE — 700111 HCHG RX REV CODE 636 W/ 250 OVERRIDE (IP): Performed by: ORTHOPAEDIC SURGERY

## 2021-03-04 PROCEDURE — 700101 HCHG RX REV CODE 250: Performed by: ORTHOPAEDIC SURGERY

## 2021-03-04 PROCEDURE — 96365 THER/PROPH/DIAG IV INF INIT: CPT | Mod: XU

## 2021-03-04 PROCEDURE — 73560 X-RAY EXAM OF KNEE 1 OR 2: CPT | Mod: LT

## 2021-03-04 DEVICE — IMPLANTABLE DEVICE: Type: IMPLANTABLE DEVICE | Site: KNEE | Status: FUNCTIONAL

## 2021-03-04 DEVICE — BONE CEMENT SIMPLEX ANTIBIO - (10/PK): Type: IMPLANTABLE DEVICE | Site: KNEE | Status: FUNCTIONAL

## 2021-03-04 RX ORDER — ONDANSETRON 2 MG/ML
4 INJECTION INTRAMUSCULAR; INTRAVENOUS EVERY 4 HOURS PRN
Status: DISCONTINUED | OUTPATIENT
Start: 2021-03-04 | End: 2021-03-04 | Stop reason: HOSPADM

## 2021-03-04 RX ORDER — DIPHENHYDRAMINE HYDROCHLORIDE 50 MG/ML
12.5 INJECTION INTRAMUSCULAR; INTRAVENOUS
Status: DISCONTINUED | OUTPATIENT
Start: 2021-03-04 | End: 2021-03-04 | Stop reason: HOSPADM

## 2021-03-04 RX ORDER — ACETAMINOPHEN 500 MG
1000 TABLET ORAL ONCE
Status: COMPLETED | OUTPATIENT
Start: 2021-03-04 | End: 2021-03-04

## 2021-03-04 RX ORDER — OXYCODONE HYDROCHLORIDE AND ACETAMINOPHEN 5; 325 MG/1; MG/1
1 TABLET ORAL EVERY 6 HOURS PRN
COMMUNITY
Start: 2021-02-23

## 2021-03-04 RX ORDER — OXYCODONE HCL 5 MG/5 ML
10 SOLUTION, ORAL ORAL
Status: COMPLETED | OUTPATIENT
Start: 2021-03-04 | End: 2021-03-04

## 2021-03-04 RX ORDER — BISACODYL 10 MG
10 SUPPOSITORY, RECTAL RECTAL
Status: DISCONTINUED | OUTPATIENT
Start: 2021-03-04 | End: 2021-03-04 | Stop reason: HOSPADM

## 2021-03-04 RX ORDER — OXYCODONE HYDROCHLORIDE 10 MG/1
10 TABLET ORAL
Status: DISCONTINUED | OUTPATIENT
Start: 2021-03-04 | End: 2021-03-04 | Stop reason: HOSPADM

## 2021-03-04 RX ORDER — HYDRALAZINE HYDROCHLORIDE 20 MG/ML
5 INJECTION INTRAMUSCULAR; INTRAVENOUS
Status: DISCONTINUED | OUTPATIENT
Start: 2021-03-04 | End: 2021-03-04 | Stop reason: HOSPADM

## 2021-03-04 RX ORDER — BUPIVACAINE HYDROCHLORIDE AND EPINEPHRINE 5; 5 MG/ML; UG/ML
INJECTION, SOLUTION EPIDURAL; INTRACAUDAL; PERINEURAL
Status: DISCONTINUED | OUTPATIENT
Start: 2021-03-04 | End: 2021-03-04 | Stop reason: HOSPADM

## 2021-03-04 RX ORDER — OXYCODONE HCL 5 MG/5 ML
5 SOLUTION, ORAL ORAL
Status: COMPLETED | OUTPATIENT
Start: 2021-03-04 | End: 2021-03-04

## 2021-03-04 RX ORDER — OXYCODONE HYDROCHLORIDE 5 MG/1
5 TABLET ORAL
Status: DISCONTINUED | OUTPATIENT
Start: 2021-03-04 | End: 2021-03-04 | Stop reason: HOSPADM

## 2021-03-04 RX ORDER — TRANEXAMIC ACID 100 MG/ML
INJECTION, SOLUTION INTRAVENOUS PRN
Status: DISCONTINUED | OUTPATIENT
Start: 2021-03-04 | End: 2021-03-04 | Stop reason: SURG

## 2021-03-04 RX ORDER — DEXAMETHASONE SODIUM PHOSPHATE 4 MG/ML
4 INJECTION, SOLUTION INTRA-ARTICULAR; INTRALESIONAL; INTRAMUSCULAR; INTRAVENOUS; SOFT TISSUE
Status: DISCONTINUED | OUTPATIENT
Start: 2021-03-04 | End: 2021-03-04 | Stop reason: HOSPADM

## 2021-03-04 RX ORDER — SODIUM CHLORIDE, SODIUM LACTATE, POTASSIUM CHLORIDE, CALCIUM CHLORIDE 600; 310; 30; 20 MG/100ML; MG/100ML; MG/100ML; MG/100ML
INJECTION, SOLUTION INTRAVENOUS
Status: DISCONTINUED | OUTPATIENT
Start: 2021-03-04 | End: 2021-03-04

## 2021-03-04 RX ORDER — PHENYLEPHRINE HCL IN 0.9% NACL 0.5 MG/5ML
SYRINGE (ML) INTRAVENOUS PRN
Status: DISCONTINUED | OUTPATIENT
Start: 2021-03-04 | End: 2021-03-04 | Stop reason: SURG

## 2021-03-04 RX ORDER — LIDOCAINE HYDROCHLORIDE 20 MG/ML
INJECTION, SOLUTION EPIDURAL; INFILTRATION; INTRACAUDAL; PERINEURAL PRN
Status: DISCONTINUED | OUTPATIENT
Start: 2021-03-04 | End: 2021-03-04 | Stop reason: SURG

## 2021-03-04 RX ORDER — HYDROMORPHONE HYDROCHLORIDE 1 MG/ML
0.2 INJECTION, SOLUTION INTRAMUSCULAR; INTRAVENOUS; SUBCUTANEOUS
Status: DISCONTINUED | OUTPATIENT
Start: 2021-03-04 | End: 2021-03-04 | Stop reason: HOSPADM

## 2021-03-04 RX ORDER — CEFAZOLIN SODIUM IN 0.9 % NACL 2 G/100 ML
2 PLASTIC BAG, INJECTION (ML) INTRAVENOUS ONCE
Status: COMPLETED | OUTPATIENT
Start: 2021-03-04 | End: 2021-03-04

## 2021-03-04 RX ORDER — AMOXICILLIN 250 MG
1 CAPSULE ORAL NIGHTLY
Status: DISCONTINUED | OUTPATIENT
Start: 2021-03-04 | End: 2021-03-04 | Stop reason: HOSPADM

## 2021-03-04 RX ORDER — CEFAZOLIN SODIUM 1 G/3ML
INJECTION, POWDER, FOR SOLUTION INTRAMUSCULAR; INTRAVENOUS PRN
Status: DISCONTINUED | OUTPATIENT
Start: 2021-03-04 | End: 2021-03-04 | Stop reason: SURG

## 2021-03-04 RX ORDER — MORPHINE SULFATE 4 MG/ML
4 INJECTION, SOLUTION INTRAMUSCULAR; INTRAVENOUS
Status: DISCONTINUED | OUTPATIENT
Start: 2021-03-04 | End: 2021-03-04 | Stop reason: HOSPADM

## 2021-03-04 RX ORDER — MELOXICAM 15 MG/1
15 TABLET ORAL EVERY 6 HOURS PRN
COMMUNITY
Start: 2021-02-23

## 2021-03-04 RX ORDER — ROPIVACAINE HYDROCHLORIDE 5 MG/ML
INJECTION, SOLUTION EPIDURAL; INFILTRATION; PERINEURAL
Status: COMPLETED | OUTPATIENT
Start: 2021-03-04 | End: 2021-03-04

## 2021-03-04 RX ORDER — DOCUSATE SODIUM 100 MG/1
100 CAPSULE, LIQUID FILLED ORAL 2 TIMES DAILY
Status: DISCONTINUED | OUTPATIENT
Start: 2021-03-04 | End: 2021-03-04 | Stop reason: HOSPADM

## 2021-03-04 RX ORDER — AMOXICILLIN 250 MG
1 CAPSULE ORAL
Status: DISCONTINUED | OUTPATIENT
Start: 2021-03-04 | End: 2021-03-04 | Stop reason: HOSPADM

## 2021-03-04 RX ORDER — ENEMA 19; 7 G/133ML; G/133ML
1 ENEMA RECTAL
Status: DISCONTINUED | OUTPATIENT
Start: 2021-03-04 | End: 2021-03-04 | Stop reason: HOSPADM

## 2021-03-04 RX ORDER — HYDROMORPHONE HYDROCHLORIDE 1 MG/ML
0.1 INJECTION, SOLUTION INTRAMUSCULAR; INTRAVENOUS; SUBCUTANEOUS
Status: DISCONTINUED | OUTPATIENT
Start: 2021-03-04 | End: 2021-03-04 | Stop reason: HOSPADM

## 2021-03-04 RX ORDER — POLYETHYLENE GLYCOL 3350 17 G/17G
1 POWDER, FOR SOLUTION ORAL 2 TIMES DAILY PRN
Status: DISCONTINUED | OUTPATIENT
Start: 2021-03-04 | End: 2021-03-04 | Stop reason: HOSPADM

## 2021-03-04 RX ORDER — HYDROMORPHONE HYDROCHLORIDE 1 MG/ML
0.4 INJECTION, SOLUTION INTRAMUSCULAR; INTRAVENOUS; SUBCUTANEOUS
Status: DISCONTINUED | OUTPATIENT
Start: 2021-03-04 | End: 2021-03-04 | Stop reason: HOSPADM

## 2021-03-04 RX ORDER — SCOLOPAMINE TRANSDERMAL SYSTEM 1 MG/1
1 PATCH, EXTENDED RELEASE TRANSDERMAL
Status: DISCONTINUED | OUTPATIENT
Start: 2021-03-04 | End: 2021-03-04 | Stop reason: HOSPADM

## 2021-03-04 RX ORDER — DIPHENHYDRAMINE HYDROCHLORIDE 50 MG/ML
25 INJECTION INTRAMUSCULAR; INTRAVENOUS EVERY 6 HOURS PRN
Status: DISCONTINUED | OUTPATIENT
Start: 2021-03-04 | End: 2021-03-04 | Stop reason: HOSPADM

## 2021-03-04 RX ORDER — HALOPERIDOL 5 MG/ML
1 INJECTION INTRAMUSCULAR EVERY 6 HOURS PRN
Status: DISCONTINUED | OUTPATIENT
Start: 2021-03-04 | End: 2021-03-04 | Stop reason: HOSPADM

## 2021-03-04 RX ORDER — HALOPERIDOL 5 MG/ML
1 INJECTION INTRAMUSCULAR
Status: DISCONTINUED | OUTPATIENT
Start: 2021-03-04 | End: 2021-03-04 | Stop reason: HOSPADM

## 2021-03-04 RX ORDER — DEXAMETHASONE SODIUM PHOSPHATE 4 MG/ML
INJECTION, SOLUTION INTRA-ARTICULAR; INTRALESIONAL; INTRAMUSCULAR; INTRAVENOUS; SOFT TISSUE PRN
Status: DISCONTINUED | OUTPATIENT
Start: 2021-03-04 | End: 2021-03-04 | Stop reason: SURG

## 2021-03-04 RX ORDER — ONDANSETRON 2 MG/ML
INJECTION INTRAMUSCULAR; INTRAVENOUS PRN
Status: DISCONTINUED | OUTPATIENT
Start: 2021-03-04 | End: 2021-03-04 | Stop reason: SURG

## 2021-03-04 RX ORDER — ONDANSETRON 2 MG/ML
4 INJECTION INTRAMUSCULAR; INTRAVENOUS
Status: DISCONTINUED | OUTPATIENT
Start: 2021-03-04 | End: 2021-03-04 | Stop reason: HOSPADM

## 2021-03-04 RX ORDER — SODIUM CHLORIDE, SODIUM LACTATE, POTASSIUM CHLORIDE, CALCIUM CHLORIDE 600; 310; 30; 20 MG/100ML; MG/100ML; MG/100ML; MG/100ML
1000 INJECTION, SOLUTION INTRAVENOUS CONTINUOUS
Status: DISCONTINUED | OUTPATIENT
Start: 2021-03-04 | End: 2021-03-04 | Stop reason: HOSPADM

## 2021-03-04 RX ORDER — CEFAZOLIN SODIUM IN 0.9 % NACL 2 G/100 ML
2 PLASTIC BAG, INJECTION (ML) INTRAVENOUS ONCE
Status: DISCONTINUED | OUTPATIENT
Start: 2021-03-04 | End: 2021-03-04 | Stop reason: HOSPADM

## 2021-03-04 RX ORDER — TRANEXAMIC ACID 100 MG/ML
INJECTION, SOLUTION INTRAVENOUS
Status: COMPLETED | OUTPATIENT
Start: 2021-03-04 | End: 2021-03-04

## 2021-03-04 RX ORDER — TOFACITINIB 11 MG/1
11 TABLET, FILM COATED, EXTENDED RELEASE ORAL 3 TIMES DAILY
COMMUNITY
Start: 2021-02-18

## 2021-03-04 RX ORDER — CELECOXIB 200 MG/1
200 CAPSULE ORAL ONCE
Status: DISCONTINUED | OUTPATIENT
Start: 2021-03-04 | End: 2021-03-04 | Stop reason: HOSPADM

## 2021-03-04 RX ADMIN — DEXAMETHASONE SODIUM PHOSPHATE 8 MG: 4 INJECTION, SOLUTION INTRAMUSCULAR; INTRAVENOUS at 07:05

## 2021-03-04 RX ADMIN — CEFAZOLIN 2 G: 1 INJECTION, POWDER, FOR SOLUTION INTRAVENOUS at 06:59

## 2021-03-04 RX ADMIN — VANCOMYCIN HYDROCHLORIDE 1000 MG: 1 INJECTION, POWDER, LYOPHILIZED, FOR SOLUTION INTRAVENOUS at 06:51

## 2021-03-04 RX ADMIN — TRANEXAMIC ACID 1000 MG: 100 INJECTION, SOLUTION INTRAVENOUS at 06:58

## 2021-03-04 RX ADMIN — OXYCODONE HYDROCHLORIDE 10 MG: 10 TABLET ORAL at 11:35

## 2021-03-04 RX ADMIN — DOCUSATE SODIUM 100 MG: 100 CAPSULE, LIQUID FILLED ORAL at 10:38

## 2021-03-04 RX ADMIN — ACETAMINOPHEN 1000 MG: 500 TABLET, FILM COATED ORAL at 06:41

## 2021-03-04 RX ADMIN — Medication 50 MCG: at 07:25

## 2021-03-04 RX ADMIN — FENTANYL CITRATE 50 MCG: 50 INJECTION, SOLUTION INTRAMUSCULAR; INTRAVENOUS at 08:35

## 2021-03-04 RX ADMIN — OXYCODONE HYDROCHLORIDE 10 MG: 5 SOLUTION ORAL at 08:33

## 2021-03-04 RX ADMIN — Medication 100 MCG: at 07:11

## 2021-03-04 RX ADMIN — SODIUM CHLORIDE, POTASSIUM CHLORIDE, SODIUM LACTATE AND CALCIUM CHLORIDE 1000 ML: 600; 310; 30; 20 INJECTION, SOLUTION INTRAVENOUS at 09:15

## 2021-03-04 RX ADMIN — FENTANYL CITRATE 50 MCG: 50 INJECTION, SOLUTION INTRAMUSCULAR; INTRAVENOUS at 08:47

## 2021-03-04 RX ADMIN — SODIUM CHLORIDE, POTASSIUM CHLORIDE, SODIUM LACTATE AND CALCIUM CHLORIDE: 600; 310; 30; 20 INJECTION, SOLUTION INTRAVENOUS at 06:56

## 2021-03-04 RX ADMIN — LIDOCAINE HYDROCHLORIDE 60 MG: 20 INJECTION, SOLUTION EPIDURAL; INFILTRATION; INTRACAUDAL; PERINEURAL at 06:59

## 2021-03-04 RX ADMIN — FENTANYL CITRATE 50 MCG: 50 INJECTION, SOLUTION INTRAMUSCULAR; INTRAVENOUS at 07:16

## 2021-03-04 RX ADMIN — ROPIVACAINE HYDROCHLORIDE 25 ML: 5 INJECTION, SOLUTION EPIDURAL; INFILTRATION; PERINEURAL at 06:40

## 2021-03-04 RX ADMIN — PROPOFOL 150 MG: 10 INJECTION, EMULSION INTRAVENOUS at 06:59

## 2021-03-04 RX ADMIN — FENTANYL CITRATE 50 MCG: 50 INJECTION, SOLUTION INTRAMUSCULAR; INTRAVENOUS at 08:03

## 2021-03-04 RX ADMIN — FENTANYL CITRATE 50 MCG: 50 INJECTION, SOLUTION INTRAMUSCULAR; INTRAVENOUS at 08:39

## 2021-03-04 RX ADMIN — CEFAZOLIN 2 G: 1 INJECTION, POWDER, FOR SOLUTION INTRAVENOUS at 10:38

## 2021-03-04 RX ADMIN — FENTANYL CITRATE 50 MCG: 50 INJECTION, SOLUTION INTRAMUSCULAR; INTRAVENOUS at 06:59

## 2021-03-04 RX ADMIN — ONDANSETRON 4 MG: 2 INJECTION INTRAMUSCULAR; INTRAVENOUS at 07:05

## 2021-03-04 ASSESSMENT — GAIT ASSESSMENTS
GAIT LEVEL OF ASSIST: SUPERVISED
ASSISTIVE DEVICE: FRONT WHEEL WALKER
DEVIATION: SHUFFLED GAIT
DISTANCE (FEET): 100

## 2021-03-04 ASSESSMENT — COGNITIVE AND FUNCTIONAL STATUS - GENERAL
WALKING IN HOSPITAL ROOM: A LITTLE
TOILETING: A LITTLE
WALKING IN HOSPITAL ROOM: A LITTLE
CLIMB 3 TO 5 STEPS WITH RAILING: A LITTLE
SUGGESTED CMS G CODE MODIFIER MOBILITY: CK
CLIMB 3 TO 5 STEPS WITH RAILING: A LITTLE
DRESSING REGULAR LOWER BODY CLOTHING: A LITTLE
MOVING FROM LYING ON BACK TO SITTING ON SIDE OF FLAT BED: A LITTLE
HELP NEEDED FOR BATHING: A LITTLE
MOVING FROM LYING ON BACK TO SITTING ON SIDE OF FLAT BED: A LITTLE
MOVING TO AND FROM BED TO CHAIR: A LITTLE
TURNING FROM BACK TO SIDE WHILE IN FLAT BAD: A LITTLE
DAILY ACTIVITIY SCORE: 21
DAILY ACTIVITIY SCORE: 22
STANDING UP FROM CHAIR USING ARMS: A LITTLE
SUGGESTED CMS G CODE MODIFIER DAILY ACTIVITY: CJ
HELP NEEDED FOR BATHING: A LITTLE
SUGGESTED CMS G CODE MODIFIER MOBILITY: CJ
SUGGESTED CMS G CODE MODIFIER DAILY ACTIVITY: CJ
MOBILITY SCORE: 21
DRESSING REGULAR LOWER BODY CLOTHING: A LITTLE
MOBILITY SCORE: 18

## 2021-03-04 ASSESSMENT — PATIENT HEALTH QUESTIONNAIRE - PHQ9
1. LITTLE INTEREST OR PLEASURE IN DOING THINGS: NOT AT ALL
2. FEELING DOWN, DEPRESSED, IRRITABLE, OR HOPELESS: NOT AT ALL
SUM OF ALL RESPONSES TO PHQ9 QUESTIONS 1 AND 2: 0

## 2021-03-04 ASSESSMENT — PAIN DESCRIPTION - PAIN TYPE
TYPE: SURGICAL PAIN
TYPE: SURGICAL PAIN
TYPE: ACUTE PAIN

## 2021-03-04 ASSESSMENT — ACTIVITIES OF DAILY LIVING (ADL): TOILETING: INDEPENDENT

## 2021-03-04 ASSESSMENT — FIBROSIS 4 INDEX: FIB4 SCORE: 1.74

## 2021-03-04 ASSESSMENT — PAIN SCALES - GENERAL: PAIN_LEVEL: 4

## 2021-03-04 NOTE — DISCHARGE PLANNING
Received Choice form at 9741  Agency/Facility Name: [facility]  Referral sent per Choice form @ 1530

## 2021-03-04 NOTE — ANESTHESIA POSTPROCEDURE EVALUATION
Patient: Tayla Molina    Procedure Summary     Date: 03/04/21 Room / Location:  OR 02 / SURGERY [facility]    Anesthesia Start: 0655 Anesthesia Stop: 0823    Procedure: ARTHROPLASTY, KNEE, TOTAL (Left Knee) Diagnosis: (PES ANSERINUS BURSITIS LEFT, KNEE PAIN LEFT)    Surgeons: Dakota Hong M.D. Responsible Provider: Jay Leblanc M.D.    Anesthesia Type: general, peripheral nerve block ASA Status: 3          Final Anesthesia Type: general, peripheral nerve block  Last vitals  BP   Blood Pressure : 113/62    Temp   36.4 °C (97.5 °F)    Pulse   81   Resp   16    SpO2   100 %      Anesthesia Post Evaluation    Patient location during evaluation: PACU  Patient participation: complete - patient participated  Level of consciousness: awake and alert  Pain score: 4    Airway patency: patent  Anesthetic complications: no  Cardiovascular status: hemodynamically stable  Respiratory status: acceptable  Hydration status: euvolemic    PONV: none    patient able to participate, but full recovery from regional anesthesia has not occurred and is not expected within the stipulated timeframe for the completion of the evaluation      No complications documented.     Nurse Pain Score: 4 (NPRS)

## 2021-03-04 NOTE — ANESTHESIA PREPROCEDURE EVALUATION
"80 y/o female w/ left arthritic knee pain.    Relevant Problems   NEURO   (+) History of stroke      CARDIAC   (+) Migraine with aura and without status migrainosus, not intractable      ENDO   (+) Hypothyroidism due to acquired atrophy of thyroid      Other   (+) Left-sided weakness   (+) Primary insomnia   (+) Rheumatoid arthritis involving multiple sites with positive rheumatoid factor (HCC)     Stop Bang Total Score  3      Allergies: Food, Codeine, Lyrica    HT: 1.549 m (5' 1\")  WT: 67.7 kg (149 lb 4 oz)  BMI: 28.2 kg/m 2    Physical Exam    Airway   Mallampati: I  TM distance: >3 FB  Neck ROM: full       Cardiovascular - normal exam  Rhythm: regular  Rate: normal  (-) murmur     Dental - normal exam  (+) upper dentures, lower dentures           Pulmonary - normal exam  Breath sounds clear to auscultation     Abdominal    Neurological - normal exam               Anesthesia Plan    ASA 3   ASA physical status 3 criteria: CVA or TIA - history (> 3 months)    Plan - general and peripheral nerve block     Peripheral nerve block will be post-op pain control  Airway plan will be LMA        Plan Factors:   Patient was not previously instructed to abstain from smoking on day of procedure.  Patient did not smoke on day of procedure.      Induction: intravenous    Postoperative Plan: Postoperative administration of opioids is intended.    Pertinent diagnostic labs and testing reviewed    Informed Consent:    Anesthetic plan and risks discussed with patient.    Use of blood products discussed with: patient whom consented to blood products.         "

## 2021-03-04 NOTE — THERAPY
Occupational Therapy   Initial Evaluation     Patient Name: Tayla Molina  Age:  81 y.o., Sex:  female  Medical Record #: [id number]  Today's Date: 3/4/2021     Precautions: (P) Weight Bearing As Tolerated Left Lower Extremity    Assessment  Patient is 81 y.o. female s/p L TKA. A&Ox4, motivated for home today. ADL transfers with Sup,FWW,v/c's. Walks slowly in room; steady with FWW. Toileting, FB dressing with Sup. Reviewed home safety during ADL's. Lives with daughter who is present throughout session. D/C ready from OT.         Plan  Recommend Occupational Therapy for Evaluation only   DC Equipment Recommendations: (P) Front-Wheel Walker  Discharge Recommendations: (P) Anticipate that the patient will have no further occupational therapy needs after discharge from the hospital      03/04/21 1332   Prior Living Situation   Prior Services None   Housing / Facility 1 Story House   Steps Into Home 3   Steps In Home 0   Bathroom Set up Walk In Shower;Grab Bars;Shower Chair   Equipment Owned Single Point Cane;Tub / Shower Seat;Grab Bar(s) In Tub / Shower   Lives with - Patient's Self Care Capacity Adult Children   Comments Pt states using SPC prior to TKA. Dtr to assist as needed.    Prior Level of ADL Function   Self Feeding Independent   Grooming / Hygiene Independent   Bathing Independent   Dressing Independent   Toileting Independent   Prior Level of IADL Function   Medication Management Independent   Laundry Independent   Kitchen Mobility Independent   Finances Independent   Home Management Independent   Shopping Independent   Prior Level Of Mobility Independent With Device in Home   Driving / Transportation Unable To Determine At This Time   Occupation (Pre-Hospital Vocational) Retired Due To Age   Leisure Interests Unable To Determine At This Time   Balance Assessment   Sitting Balance (Static) Good   Sitting Balance (Dynamic) Fair +   Standing Balance (Static) Good   Standing Balance (Dynamic) Fair +    Weight Shift Sitting Good   Weight Shift Standing Fair   Bed Mobility    Supine to Sit Supervised   ADL Assessment   Eating Independent   Grooming Independent   Upper Body Dressing Independent   Lower Body Dressing Supervision   Toileting Supervision   Functional Mobility   Sit to Stand Supervised   Bed, Chair, Wheelchair Transfer Supervised   Toilet Transfers Supervised

## 2021-03-04 NOTE — DISCHARGE PLANNING
Anticipated Discharge Disposition: Home with DME- walker     Action: LSW informed by bedside RNNess that pt requiring DME- walker. Order has been placed for DME.     LSW met with pt at bedside to discuss DME CHOICE. Pt stated she would like the walker to be delivered to bedside so that she can have it upon d/c. Pt provided signature for [facility].     LSW informed bedside RNNess that it was okay to request walker to get delivered by traction.     Barriers to Discharge: None     Plan: LSW to assist as needed     Addendum 1024  LSW faxed DME CHOICE to [facility].

## 2021-03-04 NOTE — ANESTHESIA TIME REPORT
Anesthesia Start and Stop Event Times     Date Time Event    3/4/2021 06:48 AM Ready for Procedure    3/4/2021 06:55 AM Anesthesia Start    3/4/2021 08:23 AM Anesthesia Stop        Responsible Staff  03/04/21    Name Role Begin End    Jay Leblanc M.D. Anesthesiologist 03/04/21 06:55 AM 03/04/21 08:23 AM        Preop Diagnosis (Free Text):  Pre-op Diagnosis     PES ANSERINUS BURSITIS LEFT, KNEE PAIN LEFT        Preop Diagnosis (Codes):    Post op Diagnosis  Left knee pain      Premium Reason  A. 3PM - 7AM    Comments: Left TKA; pre 0700 nerve block

## 2021-03-04 NOTE — DISCHARGE INSTRUCTIONS
Discharge Instructions    Discharged to home by car with relative. Discharged via wheelchair, hospital escort: Yes.  Special equipment needed: Walker    Be sure to schedule a follow-up appointment with your primary care doctor or any specialists as instructed.     Discharge Plan:        I understand that a diet low in cholesterol, fat, and sodium is recommended for good health. Unless I have been given specific instructions below for another diet, I accept this instruction as my diet prescription.   Other diet: Regular as tolerated      Special Instructions: Discharge instructions for the Orthopedic Patient    Follow up with Primary Care Physician within 2 weeks of discharge to home, regarding:  Review of medications and diagnostic testing.  Surveillance for medical complications.  Workup and treatment of osteoporosis, if appropriate.     -Is this a Hip/Knee/Shoulder Joint Replacement patient? Yes   TOTAL KNEE REPLACEMENT, AFTER-CARE GUIDELINES     These instructions provide you with information on caring for yourself and your knee after surgery. Your health care provider may also give you instructions that are more specific. Your treatment was planned and performed according to current medical practices but problems sometimes occur. Call your health care provider if you have any problems or questions.     WHAT TO EXPECT AFTER THE PROCEDURE   After your procedure, your knee will typically be stiff, sore, and bruised. This will improve over time.     Pain   · Follow your home pain management plan as discussed with your nurse and as directed by your provider.   · It is important to follow any scheduled pain medications for maximal pain relief.   · If prescribed opioid medication, the goal is to use opioids only as needed and to wean off prescription pain medicine as soon as possible.   · Ice can be used for pain control.   · Put ice in a plastic bag.   · Place a towel between your skin and the bag.   · Leave the ice  on for 20 minutes, 2-3 times a day at a minimum.   · Most patients are off the pain pills by 3 weeks. If your pain continues to be severe, follow up with your provider.     Infection   Knee joint infections occur in fewer than 2% of patients. The most common causes of infection following total knee replacement surgery are from bacteria that enter the bloodstream during dental procedures, urinary tract infections, or skin infections. These bacteria can lodge around your knee replacement and cause an infection.   · Keep the incision as clean and dry as possible.   · Always wash your hands before touching your incision.   · Avoid dental care for 3 months after surgery. Your provider may recommend taking a dose of antibiotics an hour prior to any dental procedure. After 2 years, most providers recommend antibiotics only before an extensive procedure. Ask your provider what they recommend.   · Signs and symptoms of infection include low-grade fever, redness, pain, swelling and drainage from your incision. Notify your provider IMMEDIATELY if you develop ANY of these symptoms.     Post op Disturbances   · Bowel Habits - Constipation is extremely common and caused by a combination of anesthesia, lack of mobility, dehydration and pain medicine. Use stool softeners or laxatives if necessary. It is important not to ignore this problem as bowel obstructions can be a serious complication after joint replacement surgery.   · Mood/Energy Level - Many patients experience a lack of energy and endurance for up to 2-3 months after surgery. Some people feel down and can even become depressed. This is likely due to postoperative anemia, change in activity level, lack of sleep, pain medicine and just the emotional reaction to the surgery itself that is a big disruption in a person’s life. This usually passes. If symptoms persist, follow up with your primary care provider.  · Returning to Work - Your provider will give you specific  instructions based on your profession. Generally, if you work a sedentary job requiring little standing or walking, most patients may return within 2-6 weeks. Manual labor jobs involving walking, lifting and standing may take 3-4 months. Your provider’s office can provide a release to part-time or light duty work early on in your recovery and progress you to full duty as able.   · Driving - You can begin driving once cleared by your provider, provided you are no longer taking narcotic pain medication or any other medications that impair driving. Discuss the length of time expected with your provider as returning to driving depends on things such as your vehicle, which knee was replaced (right or left), and knee motion, strength and reflexes returning appropriately.   · Avoiding falls - A fall during the first few weeks after surgery can damage your new knee and may result in a need for further surgery.  throw rugs and tack down loose carpeting. Be aware of floor hazards such as pets, small objects or uneven surfaces. Notify your provider of any falls.   · Airport Metal Detectors - The sensitivity of metal detectors varies and it is likely that your prosthesis will cause an alarm. Inform the  of your artificial joint.     Diet   · Resume your normal diet as tolerated.   · It is important to achieve a healthy nutritional status by eating a well-balanced diet on a regular basis.   · Your provider may recommend that you take iron and vitamin supplements.   · Continue to drink plenty of fluids.     Shower/Bathing   · You may shower as soon as you get home from the hospital unless otherwise instructed.   · Keep your incision out of water to prevent infection. To keep the incision dry when showering, cover it with a plastic bag or plastic wrap. If your bandage is waterproof, this may not be necessary. o Pat incision dry if it gets wet. Do not rub. Notify your provider.   · Do not submerge in a bath  until cleared by your provider. Your staples must be out and the incision completely healed.     Dressing Change: Only change your dressing if directed by your provider.   · Wash hands.   · Open all dressing change materials.   · Remove old dressing and discard.   · Inspect incision for signs of irritation or infection including redness, increase in clear drainage, yellow/green drainage, odor and surrounding skin hot to touch. Notify your provider if present.   ·  the new dressing by one corner and lay over the incision. Be careful not to touch the inside of the dressing that will lay over the incision.   · Secure in place as instructed. Swelling/Bruising   · Swelling is normal after knee replacement and can involve the thigh, knee, calf and foot.   · Swelling can last from 3-6 months.   · To reduce swelling, elevate your leg higher than your heart while reclining. The first week you are home you should elevate your leg an equal amount of time as you are active.   · The swelling is usually worse after you go home since you are upright for longer periods of time.   · Bruising often does not appear until after you arrive home and can be quite dramatic- appearing purple, black, or green. Bruising is typically not concerning and will subside without any treatment.     Blood Clot Prevention   Your treatment plan includes multiple preventative measures to decrease the risk of blood clots in the legs (DVTs) and the less common, but serious, clots that travel to the lungs (pulmonary emboli). Most patients are at standard risk for them, but people who are at higher risk include those who have had previous clots, a family history of clotting, smoking, diabetes, obesity, advanced age, use estrogen and/or live a sedentary lifestyle.     · Signs of blood clots in legs include - Swelling in thigh, calf or ankle that does not go down with elevation. Pain, heat and tenderness in calf, back of calf or groin area. NOTE: blood  clots can occur in either leg.   · Signs of blood clots in lungs include - Sudden increased shortness of breath, sudden onset of chest pain, and localized chest pain with coughing.   · If you experience any of the above symptoms, notify your provider and seek medical attention immediately.   · You received anticoagulant therapy (blood thinners) in the hospital. Continue the prescribed blood-thinning medication at home, as directed by your provider.   · Your risk for developing a clot continues for up to 2-3 months after surgery. Avoid prolonged sitting and dehydration (long air trips and car trips). If you do take a trip during this time, please get up, move around every 1-1.5 hours, and discuss all travel plans with your provider.     Activity   Once home, stay active. The key is not to overdo it. While you can expect some good days and some bad days, you should notice a gradual improvement and a gradual increase in your endurance over the next 6 to 12 months. Exercise is a critical component of recovery, particularly during the first few weeks after surgery.     · Normal activities of daily living - Expect to resume most within 3 to 6 weeks following surgery. Some pain with activity and at night is common for several weeks after surgery. Walk as much as you like once your doctor gives permission to proceed, but remember that walking is no substitute for the exercises your doctor and physical therapist prescribe. Use a walker, crutches or cane to assist with walking until you can walk smoothly (minimal or no limp) without assistance.   · Physical Therapy Exercises - Follow your home exercise program as instructed by your physical therapist during your hospital stay. Call and set up outpatient physical therapy appointments per your provider’s recommendations. Physical therapy after the hospital stay focuses on increasing your range of motion, strengthening your muscles and improving your gait/walking pattern.  Contact your provider for the referral to outpatient physical therapy if you have not yet received this. -   · Riding a stationary bicycle can help maintain muscle tone and keep your knee flexible. Begin stationary bicycling as directed by your physical therapist or provider.   · Sexual Activity - Your provider can tell you when it safe to resume sexual activity.   · Sleeping Positions - You can safely sleep on your back, on either side, or on your stomach.   · Other Activities - Lower impact activities are preferred. Consult your provider if you have specific questions.     When to Call the Doctor   Call the provider if you experience:   · Fever over 100.5° F   · Increased pain, drainage, redness, odor or heat around the incision area   · Shaking chills   · Increased knee pain with activity and rest   · Increased pain in calf, tenderness or redness above or below the knee   · Increased swelling of calf, ankle, foot   · Sudden increased shortness of breath, sudden onset of chest pain, localized chest pain with coughing   · Incision opening   Or, if there are any questions or concerns about medications or care.     Infection statistic resource:   https://www.Verical.dotHIV/contents/prosthetic-joint-infection-epidemiology-microbiology-clinical-manifestations-and-diagnosis     -Is this patient being discharged with medication to prevent blood clots?  Yes, Aspirin Aspirin, ASA oral tablets  What is this medicine?  ASPIRIN (AS pir in) is a pain reliever. It is used to treat mild pain and fever. This medicine is also used as directed by a doctor to prevent and to treat heart attacks, to prevent strokes and blood clots, and to treat arthritis or inflammation.  This medicine may be used for other purposes; ask your health care provider or pharmacist if you have questions.  COMMON BRAND NAME(S): Aspir-Low, Aspir-Thuy, Aspirtab, Pb Advanced Aspirin, Pb Aspirin, Mixify Aspirin Extra Strength, Pb Aspirin Plus, Pb Extra  Strength, Pb Extra Strength Plus, Pb Genuine Aspirin, Pb Womens Aspirin, Bufferin, Bufferin Extra Strength, Bufferin Low Dose  What should I tell my health care provider before I take this medicine?  They need to know if you have any of these conditions:  · anemia  · asthma  · bleeding problems  · child with chickenpox, the flu, or other viral infection  · diabetes  · gout  · if you frequently drink alcohol containing drinks  · kidney disease  · liver disease  · low level of vitamin K  · lupus  · smoke tobacco  · stomach ulcers or other problems  · an unusual or allergic reaction to aspirin, tartrazine dye, other medicines, dyes, or preservatives  · pregnant or trying to get pregnant  · breast-feeding  How should I use this medicine?  Take this medicine by mouth with a glass of water. Follow the directions on the package or prescription label. You can take this medicine with or without food. If it upsets your stomach, take it with food. Do not take your medicine more often than directed.  Talk to your pediatrician regarding the use of this medicine in children. While this drug may be prescribed for children as young as 12 years of age for selected conditions, precautions do apply. Children and teenagers should not use this medicine to treat chicken pox or flu symptoms unless directed by a doctor.  Patients over 65 years old may have a stronger reaction and need a smaller dose.  Overdosage: If you think you have taken too much of this medicine contact a poison control center or emergency room at once.  NOTE: This medicine is only for you. Do not share this medicine with others.  What if I miss a dose?  If you are taking this medicine on a regular schedule and miss a dose, take it as soon as you can. If it is almost time for your next dose, take only that dose. Do not take double or extra doses.  What may interact with this medicine?  Do not take this medicine with any of the following  medications:  · cidofovir  · ketorolac  · probenecid  This medicine may also interact with the following medications:  · alcohol  · alendronate  · bismuth subsalicylate  · flavocoxid  · herbal supplements like feverfew, garlic, analilia, ginkgo biloba, horse chestnut  · medicines for diabetes or glaucoma like acetazolamide, methazolamide  · medicines for gout  · medicines that treat or prevent blood clots like enoxaparin, heparin, ticlopidine, warfarin  · other aspirin and aspirin-like medicines  · NSAIDs, medicines for pain and inflammation, like ibuprofen or naproxen  · pemetrexed  · sulfinpyrazone  · varicella live vaccine  This list may not describe all possible interactions. Give your health care provider a list of all the medicines, herbs, non-prescription drugs, or dietary supplements you use. Also tell them if you smoke, drink alcohol, or use illegal drugs. Some items may interact with your medicine.  What should I watch for while using this medicine?  If you are treating yourself for pain, tell your doctor or health care professional if the pain lasts more than 10 days, if it gets worse, or if there is a new or different kind of pain. Tell your doctor if you see redness or swelling. Also, check with your doctor if you have a fever that lasts for more than 3 days. Only take this medicine to prevent heart attacks or blood clotting if prescribed by your doctor or health care professional.  Do not take aspirin or aspirin-like medicines with this medicine. Too much aspirin can be dangerous. Always read the labels carefully.  This medicine can irritate your stomach or cause bleeding problems. Do not smoke cigarettes or drink alcohol while taking this medicine. Do not lie down for 30 minutes after taking this medicine to prevent irritation to your throat.  If you are scheduled for any medical or dental procedure, tell your healthcare provider that you are taking this medicine. You may need to stop taking this  medicine before the procedure.  This medicine may be used to treat migraines. If you take migraine medicines for 10 or more days a month, your migraines may get worse. Keep a diary of headache days and medicine use. Contact your healthcare professional if your migraine attacks occur more frequently.  What side effects may I notice from receiving this medicine?  Side effects that you should report to your doctor or health care professional as soon as possible:  · allergic reactions like skin rash, itching or hives, swelling of the face, lips, or tongue  · breathing problems  · changes in hearing, ringing in the ears  · confusion  · general ill feeling or flu-like symptoms  · pain on swallowing  · redness, blistering, peeling or loosening of the skin, including inside the mouth or nose  · signs and symptoms of bleeding such as bloody or black, tarry stools; red or dark-brown urine; spitting up blood or brown material that looks like coffee grounds; red spots on the skin; unusual bruising or bleeding from the eye, gums, or nose  · trouble passing urine or change in the amount of urine  · unusually weak or tired  · yellowing of the eyes or skin  Side effects that usually do not require medical attention (report to your doctor or health care professional if they continue or are bothersome):  · diarrhea or constipation  · headache  · nausea, vomiting  · stomach gas, heartburn  This list may not describe all possible side effects. Call your doctor for medical advice about side effects. You may report side effects to FDA at 5-336-FDA-1505.  Where should I keep my medicine?  Keep out of the reach of children.  Store at room temperature between 15 and 30 degrees C (59 and 86 degrees F). Protect from heat and moisture. Do not use this medicine if it has a strong vinegar smell. Throw away any unused medicine after the expiration date.  NOTE: This sheet is a summary. It may not cover all possible information. If you have  questions about this medicine, talk to your doctor, pharmacist, or health care provider.  © 2020 [employer]/Gold Standard (2018-01-30 10:42:13)      · Is patient discharged on Warfarin / Coumadin?   No     Depression / Suicide Risk    As you are discharged from this [facility] facility, it is important to learn how to keep safe from harming yourself.    Recognize the warning signs:  · Abrupt changes in personality, positive or negative- including increase in energy   · Giving away possessions  · Change in eating patterns- significant weight changes-  positive or negative  · Change in sleeping patterns- unable to sleep or sleeping all the time   · Unwillingness or inability to communicate  · Depression  · Unusual sadness, discouragement and loneliness  · Talk of wanting to die  · Neglect of personal appearance   · Rebelliousness- reckless behavior  · Withdrawal from people/activities they love  · Confusion- inability to concentrate     If you or a loved one observes any of these behaviors or has concerns about self-harm, here's what you can do:  · Talk about it- your feelings and reasons for harming yourself  · Remove any means that you might use to hurt yourself (examples: pills, rope, extension cords, firearm)  · Get professional help from the community (Mental Health, Substance Abuse, psychological counseling)  · Do not be alone:Call your Safe Contact- someone whom you trust who will be there for you.  · Call your local CRISIS HOTLINE 826-9891 or 391-625-4750  · Call your local Children's Mobile Crisis Response Team [place] (866) 722-7010 or www.PeerSpace  · Call the toll free National Suicide Prevention Hotlines   · National Suicide Prevention Lifeline 614-918-FPQV (1076)  · National Hope Line Network 800-SUICIDE (295-7822)

## 2021-03-04 NOTE — OR NURSING
"0820: To PACU post left total knee arthroplasty w/ block. Pt is extubated, breathing is spontaneous and unlabored. Palpable pulse observed on operative extremity.   0825: Pt states \"the pain is horrible\". Also restless and slightly agitated. See MAR.  0850: Pt sleeping, awakens easily and states pain is tolerable at 4/10. No nausea.  0915: Meets criteria for transfer to room.  "

## 2021-03-04 NOTE — PROGRESS NOTES
0911: Report received from PACU RN.  0935: Pt up to the floor with transport. Pt is sitting in bed, A+OX4 , showing no signs of distress. Pt c/o 4/10 pain and denies the need for pain medication at this time. VSS. CMS intact. Polar ice in place, dressing CDI, dorsi/plantar flexion, pulses palpable, fall precautions in place. Call light and belongings at bedside and within reach. All questions answered at this time.     Pending PT/OT eval .

## 2021-03-04 NOTE — OR NURSING
0556: Brought patient back to pre-op and assumed care.  0657: Patient allergies and NPO status verified, home medication reconciliation completed and belongings secured. Patient verbalizes understanding of pain scale, expected course of stay and plan of care. Surgical site verified with patient. IV access established. Sequentials placed on legs.

## 2021-03-04 NOTE — ANESTHESIA PROCEDURE NOTES
Airway    Date/Time: 3/4/2021 6:59 AM  Performed by: Jay Leblanc M.D.  Authorized by: Jay Leblanc M.D.     Location:  OR  Urgency:  Elective  Difficult Airway: No    Indications for Airway Management:  Anesthesia      Spontaneous Ventilation: absent    Sedation Level:  Deep  Preoxygenated: Yes    Patient Position:  Sniffing  MILS Maintained Throughout: No    Mask Difficulty Assessment:  0 - not attempted  Final Airway Type:  Supraglottic airway  Final Supraglottic Airway:  Standard LMA    SGA Size:  4  Number of Attempts at Approach:  1  Number of Other Approaches Attempted:  0

## 2021-03-04 NOTE — ANESTHESIA PROCEDURE NOTES
Peripheral Block    Date/Time: 3/4/2021 6:40 AM  Performed by: Jay Leblanc M.D.  Authorized by: Jay Leblanc M.D.     Start Time:  3/4/2021 6:40 AM  End Time:  3/4/2021 6:45 AM  Reason for Block: at surgeon's request and post-op pain management ONLY    patient identified, IV checked, site marked, risks and benefits discussed, surgical consent, monitors and equipment checked, pre-op evaluation and timeout performed    Patient Position:  Supine  Prep: ChloraPrep    Monitoring:  Heart rate, continuous pulse ox and cardiac monitor  Block Region:  Lower Extremity  Lower Extremity - Block Type:  Selective FEMORAL nerve block at the Adductor Canal    Laterality:  Left  Procedures: ultrasound guided  Image captured, interpreted and electronically stored.  Local Infiltration:  Lidocaine  Strength:  2 %  Dose:  3 ml  Block Type:  Single-shot  Needle Length:  100mm  Needle Gauge:  21 G  Needle Localization:  Ultrasound guidance  Injection Assessment:  Negative aspiration for heme, no paresthesia on injection, incremental injection and local visualized surrounding nerve on ultrasound  Evidence of intravascular injection: No     US Guided Left Selective Femoral Nerve Block at Adductor Canal:   US probe placed at mid-thigh level on externally rotated left leg and femur identified.  Probe directed medially until left Sartorius Muscle (SM), Femoral Artery (FA) and Saphenous Nerve (SN) identified in Adductor Canal (AC).  Needle inserted anterolateral to probe in an in plane approach into a subsartorial perivascular perineural position.  After negative aspiration, 25 ml of LA injected with ease and visualized spreading within the AC.

## 2021-03-04 NOTE — PROGRESS NOTES
Pt D/C home with walker, and discharge paperwork. Pt is in stable condition and has no further questions. Pt d/c with daughter via wheelchair with transport.

## 2021-03-04 NOTE — THERAPY
Physical Therapy   Initial Evaluation     Patient Name: Tayla Molina  Age:  81 y.o., Sex:  female  Medical Record #: [id number]  Today's Date: 3/4/2021     Precautions: (P) Weight Bearing As Tolerated Left Lower Extremity    Assessment  Patient is 81 y.o. female s/p L TKA. Patient lives with daughter in 1STH with 3 RAQUEL. Patient walks with SPC, and was IND with ADLs and req assistance from dtr for IADLs prior to admission. At time of evaluation, patient able to demonstrate functional mobility with SPV. Main focus of session was on stair training for RAQUEL as well as stepping onto foot stool in order to access high bed at home. Patient was able to demonstrate stair training with proper sequencing IND by end of session with assistance from daughter. Patient has no further IP PT needs at this time.     Plan    Recommend Physical Therapy for Evaluation only     DC Equipment Recommendations: (P) Front-Wheel Walker  Discharge Recommendations: (P) Recommend outpatient physical therapy services to address higher level deficits        Objective       03/04/21 1400   Prior Living Situation   Prior Services None   Housing / Facility 1 Story House   Steps Into Home 3   Steps In Home 0   Bathroom Set up Walk In Shower   Equipment Owned Single Point Cane;Tub / Shower Seat;Grab Bar(s) In Tub / Shower   Lives with - Patient's Self Care Capacity Adult Children   Comments dtr assists with IADLs, IND ADLs   Prior Level of Functional Mobility   Bed Mobility Independent   Transfer Status Independent   Ambulation Independent   Distance Ambulation (Feet)   (limited community distances)   Assistive Devices Used Single Point Cane   Stairs Independent   Comments amb with SPC   Gait Analysis   Gait Level Of Assist Supervised   Assistive Device Front Wheel Walker   Distance (Feet) 100   # of Times Distance was Traveled 1   Deviation Shuffled Gait   # of Stairs Climbed 6   Level of Assist with Stairs Supervised   Weight Bearing Status WBAT    Bed Mobility    Comments   (sitting in chair upon therapist arrival)   Functional Mobility   Sit to Stand Supervised   Bed, Chair, Wheelchair Transfer Supervised   Transfer Method Stand Step   Mobility w/ FWW   Comments cues for hand placement    Anticipated Discharge Equipment and Recommendations   DC Equipment Recommendations Front-Wheel Walker   Discharge Recommendations Recommend outpatient physical therapy services to address higher level deficits

## 2021-03-04 NOTE — OP REPORT
DATE OF SERVICE: 3/4/21    PREOPERATIVE DIAGNOSES:  1. left knee advanced tricompartmental arthritis.     POSTOPERATIVE DIAGNOSES:  1. left knee advanced tricompartmental arthritis.     PROCEDURE PERFORMED: left total knee arthroplasty    SURGEON: Dakota Hong MD.     ASSISTANT: VALENTINA Oneal    ANESTHESIA: General with Adductor canal femoral nerve block for postoperative pain control.     ANESTHESIOLOGIST: Benji    ANTIBIOTICS: Ancef and Vancomycin.     IMPLANTS: Purchase Triathlon size 3 Femur, 3 Tibia, 9 CR poly, 33 patella, simplex cement    COMPLICATIONS: None    BLOOD LOSS: 50    INDICATIONS: The patient presents with a chief complaint of knee pain recalcitrant to conservative treatment. The patient has advanced knee arthritis. The risks of the surgery were discussed at length. All questions were answered, and no guarantees given. The patient elected to proceed with the proposed procedure.     DESCRIPTION OF PROCEDURE: The patient was properly identified in the preoperative holding room and the left knee was marked as the correct surgical site. The patient was taken to the operative suite and placed in supine on the operative table. The patient underwent general anesthesia. After review of allergies, antibiotics were administered, a time out was called. The left knee and lower extremity was sterilely prepped and draped in standard fashion. The limb was exsanguinated and tourniquet was inflated to 250mmHG. A standard midline incision was made followed by medial patellar arthrotomy. The medial and lateral meniscus were removed as well as the ACL. The PCL was protected. There was significant tricompartmental arthritis. The knee was placed in 90 degrees of flexion. A drill hole was made on the distal femur. Anterior referencing measured size 3. The anterior cut followed by a distal cut, followed by a 4-in-1 cutting block followed by extramedullary tibial guide after checking appropriate rotations, slope  and height. The tibia was drilled and pinned in place. The proximal tibia cut was performed. Appropriate soft tissue balance at 0 - 30 degrees. A size 3  tibial base had good coverage without overhang, was repaired and trialed, brought in extension with a 9 poly liner. The patella was measured with a caliper, a saw cut was made, drilled for a 33 button. This struck well. The femoral punch and tibial punch was used. The trial implants were removed. The implants were cemented in place, first the tibia, then the femur, brought in extension with # 11 poly and the the patella. This was soaked with betadine and saline and once the cement was thoroughly hard, the tourniquet was deflated. Good hemostasis was obtained. Once again, retrialed and the final 9 CR poly was used, irrigated and then closed in flexion with with # 2 Quill, reinforced with # 1 Vicryl, 2-0 Vicryl and staples on skin. All counts were correct. VALENTINA Oneal  was present throughout the operation and key essential part of the success of the operation assisting with patient positioning, instrumentation, retraction, and closure.

## 2022-07-12 ENCOUNTER — APPOINTMENT (OUTPATIENT)
Dept: RADIOLOGY | Facility: MEDICAL CENTER | Age: 82
End: 2022-07-12
Attending: EMERGENCY MEDICINE
Payer: COMMERCIAL

## 2022-07-12 ENCOUNTER — HOSPITAL ENCOUNTER (EMERGENCY)
Facility: MEDICAL CENTER | Age: 82
End: 2022-07-12
Attending: EMERGENCY MEDICINE
Payer: COMMERCIAL

## 2022-07-12 VITALS
TEMPERATURE: 97.3 F | RESPIRATION RATE: 19 BRPM | SYSTOLIC BLOOD PRESSURE: 134 MMHG | OXYGEN SATURATION: 96 % | HEART RATE: 69 BPM | DIASTOLIC BLOOD PRESSURE: 65 MMHG | HEIGHT: 61 IN | BODY MASS INDEX: 26.01 KG/M2 | WEIGHT: 137.79 LBS

## 2022-07-12 DIAGNOSIS — R07.9 CHEST PAIN, UNSPECIFIED TYPE: ICD-10-CM

## 2022-07-12 DIAGNOSIS — K21.9 GASTROESOPHAGEAL REFLUX DISEASE, UNSPECIFIED WHETHER ESOPHAGITIS PRESENT: ICD-10-CM

## 2022-07-12 DIAGNOSIS — U07.1 COVID-19: ICD-10-CM

## 2022-07-12 LAB
ALBUMIN SERPL BCP-MCNC: 4.1 G/DL (ref 3.2–4.9)
ALBUMIN/GLOB SERPL: 1.5 G/DL
ALP SERPL-CCNC: 43 U/L (ref 30–99)
ALT SERPL-CCNC: 26 U/L (ref 2–50)
ANION GAP SERPL CALC-SCNC: 10 MMOL/L (ref 7–16)
AST SERPL-CCNC: 32 U/L (ref 12–45)
BASOPHILS # BLD AUTO: 0.7 % (ref 0–1.8)
BASOPHILS # BLD: 0.04 K/UL (ref 0–0.12)
BILIRUB SERPL-MCNC: 0.2 MG/DL (ref 0.1–1.5)
BUN SERPL-MCNC: 24 MG/DL (ref 8–22)
CALCIUM SERPL-MCNC: 9.3 MG/DL (ref 8.4–10.2)
CHLORIDE SERPL-SCNC: 110 MMOL/L (ref 96–112)
CO2 SERPL-SCNC: 22 MMOL/L (ref 20–33)
CREAT SERPL-MCNC: 0.81 MG/DL (ref 0.5–1.4)
D DIMER PPP IA.FEU-MCNC: 0.55 UG/ML (FEU) (ref 0–0.5)
EKG IMPRESSION: NORMAL
EOSINOPHIL # BLD AUTO: 0.12 K/UL (ref 0–0.51)
EOSINOPHIL NFR BLD: 2.2 % (ref 0–6.9)
ERYTHROCYTE [DISTWIDTH] IN BLOOD BY AUTOMATED COUNT: 43.8 FL (ref 35.9–50)
FLUAV RNA SPEC QL NAA+PROBE: NEGATIVE
FLUBV RNA SPEC QL NAA+PROBE: NEGATIVE
GFR SERPLBLD CREATININE-BSD FMLA CKD-EPI: 73 ML/MIN/1.73 M 2
GLOBULIN SER CALC-MCNC: 2.7 G/DL (ref 1.9–3.5)
GLUCOSE SERPL-MCNC: 98 MG/DL (ref 65–99)
HCT VFR BLD AUTO: 38.2 % (ref 37–47)
HGB BLD-MCNC: 12.4 G/DL (ref 12–16)
IMM GRANULOCYTES # BLD AUTO: 0.02 K/UL (ref 0–0.11)
IMM GRANULOCYTES NFR BLD AUTO: 0.4 % (ref 0–0.9)
LYMPHOCYTES # BLD AUTO: 2.05 K/UL (ref 1–4.8)
LYMPHOCYTES NFR BLD: 37.6 % (ref 22–41)
MCH RBC QN AUTO: 31.6 PG (ref 27–33)
MCHC RBC AUTO-ENTMCNC: 32.5 G/DL (ref 33.6–35)
MCV RBC AUTO: 97.2 FL (ref 81.4–97.8)
MONOCYTES # BLD AUTO: 0.46 K/UL (ref 0–0.85)
MONOCYTES NFR BLD AUTO: 8.4 % (ref 0–13.4)
NEUTROPHILS # BLD AUTO: 2.76 K/UL (ref 2–7.15)
NEUTROPHILS NFR BLD: 50.7 % (ref 44–72)
NRBC # BLD AUTO: 0 K/UL
NRBC BLD-RTO: 0 /100 WBC
PLATELET # BLD AUTO: 250 K/UL (ref 164–446)
PMV BLD AUTO: 10.2 FL (ref 9–12.9)
POTASSIUM SERPL-SCNC: 4.5 MMOL/L (ref 3.6–5.5)
PROT SERPL-MCNC: 6.8 G/DL (ref 6–8.2)
RBC # BLD AUTO: 3.93 M/UL (ref 4.2–5.4)
RSV RNA SPEC QL NAA+PROBE: NEGATIVE
SARS-COV-2 RNA RESP QL NAA+PROBE: DETECTED
SODIUM SERPL-SCNC: 142 MMOL/L (ref 135–145)
SPECIMEN SOURCE: ABNORMAL
TROPONIN T SERPL-MCNC: 7 NG/L (ref 6–19)
TROPONIN T SERPL-MCNC: 9 NG/L (ref 6–19)
WBC # BLD AUTO: 5.5 K/UL (ref 4.8–10.8)

## 2022-07-12 PROCEDURE — 71045 X-RAY EXAM CHEST 1 VIEW: CPT

## 2022-07-12 PROCEDURE — 84484 ASSAY OF TROPONIN QUANT: CPT | Mod: 91

## 2022-07-12 PROCEDURE — 71275 CT ANGIOGRAPHY CHEST: CPT | Mod: ME

## 2022-07-12 PROCEDURE — C9803 HOPD COVID-19 SPEC COLLECT: HCPCS | Performed by: EMERGENCY MEDICINE

## 2022-07-12 PROCEDURE — 85379 FIBRIN DEGRADATION QUANT: CPT

## 2022-07-12 PROCEDURE — 36415 COLL VENOUS BLD VENIPUNCTURE: CPT

## 2022-07-12 PROCEDURE — 0241U HCHG SARS-COV-2 COVID-19 NFCT DS RESP RNA 4 TRGT MIC: CPT

## 2022-07-12 PROCEDURE — 85025 COMPLETE CBC W/AUTO DIFF WBC: CPT

## 2022-07-12 PROCEDURE — 99284 EMERGENCY DEPT VISIT MOD MDM: CPT

## 2022-07-12 PROCEDURE — 80053 COMPREHEN METABOLIC PANEL: CPT

## 2022-07-12 PROCEDURE — 700117 HCHG RX CONTRAST REV CODE 255: Performed by: EMERGENCY MEDICINE

## 2022-07-12 PROCEDURE — 93005 ELECTROCARDIOGRAM TRACING: CPT | Performed by: EMERGENCY MEDICINE

## 2022-07-12 PROCEDURE — 93005 ELECTROCARDIOGRAM TRACING: CPT

## 2022-07-12 RX ORDER — OMEPRAZOLE 20 MG/1
20 CAPSULE, DELAYED RELEASE ORAL DAILY
Qty: 30 CAPSULE | Refills: 0 | Status: SHIPPED | OUTPATIENT
Start: 2022-07-12

## 2022-07-12 RX ADMIN — IOHEXOL 64 ML: 350 INJECTION, SOLUTION INTRAVENOUS at 21:04

## 2022-07-12 ASSESSMENT — FIBROSIS 4 INDEX: FIB4 SCORE: 1.76

## 2022-07-13 NOTE — ED PROVIDER NOTES
ED Provider Note    Scribed for Jason Corcoran M.D. by Ines Alves. 7/12/2022, 8:18 PM.    Primary care provider: Phill De Jesus M.D.  Means of arrival: Walk in    History obtained from: Patient  History limited by: None    CHIEF COMPLAINT  Chief Complaint   Patient presents with    Chest Pain     Intermittent sharp central CP Non radiating x 3 wks  Current episode started last night  Denies SOB,nausea, diaph or palpitations  Saw PMD and referred to ED R/O ACS       HPI  Tayla Molina is a 82 y.o. female who presents to the Emergency Department for intermittent chest pain onset three weeks ago. She describes the pain as a sharp sensation and does not radiate. She is on a daily aspirin, but no other blood thinners. She experiences associated productive cough and left hand pain. She denies associated jaw pain, fever, nausea, vomiting, diaphoresis, pedal edema, or leg pain. She denies any history of deep vein thrombosis or pulmonary embolisms, but does have a family history of myocardial infarctions. She has no history of hypertension or hyperlipidemia. She stopped smoking in 1997. She has never seen a cardiologist or had a stress test done. No alleviating or exacerbating factors were identified. She is vaccinated for COVID-19.     REVIEW OF SYSTEMS  Pertinent positives include chest pain, left hand pain, and productive cough. Pertinent negatives include no aw pain, fever, nausea, vomiting, diaphoresis, pedal edema, or leg pain. All other systems negative.    PAST MEDICAL HISTORY   has a past medical history of Arthritis, Hypothyroidism, Osteoporosis, Pneumonia (2000), Psychiatric problem, Sleep apnea, Snoring, and Stroke (HCC).    SURGICAL HISTORY   has a past surgical history that includes dental extraction(s); primary c section; gastric bypass laparoscopic (N/A, 2004); mastectomy (Bilateral, 1974); abdominal exploration (2004); kishan by laparoscopy (2002); vaginal hysterectomy total (1972);  "laminotomy (); and total knee arthroplasty (Left, 3/4/2021).    SOCIAL HISTORY  Social History     Tobacco Use    Smoking status: Former Smoker     Types: Cigarettes     Start date:      Quit date: 1987     Years since quittin.9    Smokeless tobacco: Never Used   Vaping Use    Vaping Use: Never used   Substance Use Topics    Alcohol use: Not Currently    Drug use: Never      Social History     Substance and Sexual Activity   Drug Use Never       FAMILY HISTORY  Family History   Problem Relation Age of Onset    Hypertension Father        CURRENT MEDICATIONS  Current Outpatient Medications   Medication Instructions    aspirin EC (ECOTRIN) 81 mg, Oral, DAILY    CALCIUM PO Oral, DAILY    famciclovir (FAMVIR) 250 MG Tab TAKE 1 TABLET BY MOUTH TWICE DAILY TILL GONE    levothyroxine (SYNTHROID) 100 mcg, Oral, EACH MORNING ON EMPTY STOMACH    meloxicam (MOBIC) 15 mg, Oral, EVERY 6 HOURS PRN, AS NEEDED    methylPREDNISolone (MEDROL DOSEPAK) 4 MG Tablet Therapy Pack Follow schedule on package instructions.    Multiple Vitamin (MULTIVITAMIN PO) Oral, DAILY    oxyCODONE-acetaminophen (PERCOCET) 5-325 MG Tab 1 Tablet, Oral, EVERY 6 HOURS PRN    topiramate (TOPAMAX) 50 mg, Oral, 2 TIMES DAILY    traZODone (DESYREL) 50 mg, Oral, EVERY BEDTIME    Vitamin B-2 400 mg, Oral, DAILY    Xeljanz XR 11 mg, Oral, 3 TIMES DAILY    Xeljanz 10 mg, Oral, 2 TIMES DAILY     ALLERGIES  Allergies   Allergen Reactions    Food Anaphylaxis     Kiwi    Codeine Vomiting    Lyrica      Dizziness and ankle edema       PHYSICAL EXAM  VITAL SIGNS: /45   Pulse 74   Temp 37.1 °C (98.7 °F) (Temporal)   Resp 16   Ht 1.549 m (5' 1\")   Wt 62.5 kg (137 lb 12.6 oz)   SpO2 93%   BMI 26.03 kg/m²     Constitutional: Well developed, Well nourished, Mild distress.   HENT: Normocephalic, Atraumatic, mask in place.  Eyes: Conjunctiva normal, No discharge.   Cardiovascular:  Normal heart rate, Normal rhythm, No murmurs, equal pulses. "   Pulmonary:  Normal breath sounds, No respiratory distress, No wheezing, No rales, No rhonchi.  Chest: Chest pain non reproducible with palpation of the sternum, No chest wall deformity.   Abdomen:Soft, No tenderness, No masses, no rebound, no guarding.   Back: No CVA tenderness.   Musculoskeletal: No calf tenderness, No edema in the legs, No cords, No major deformities noted, No tenderness.   Skin: Warm, Dry, No erythema, No rash.   Neurologic: Alert & oriented x 3, Normal motor function,  No focal deficits noted.   Psychiatric: Affect normal, Judgment normal, Mood normal.     LABS  Results for orders placed or performed during the hospital encounter of 07/12/22   CBC with Differential   Result Value Ref Range    WBC 5.5 4.8 - 10.8 K/uL    RBC 3.93 (L) 4.20 - 5.40 M/uL    Hemoglobin 12.4 12.0 - 16.0 g/dL    Hematocrit 38.2 37.0 - 47.0 %    MCV 97.2 81.4 - 97.8 fL    MCH 31.6 27.0 - 33.0 pg    MCHC 32.5 (L) 33.6 - 35.0 g/dL    RDW 43.8 35.9 - 50.0 fL    Platelet Count 250 164 - 446 K/uL    MPV 10.2 9.0 - 12.9 fL    Neutrophils-Polys 50.70 44.00 - 72.00 %    Lymphocytes 37.60 22.00 - 41.00 %    Monocytes 8.40 0.00 - 13.40 %    Eosinophils 2.20 0.00 - 6.90 %    Basophils 0.70 0.00 - 1.80 %    Immature Granulocytes 0.40 0.00 - 0.90 %    Nucleated RBC 0.00 /100 WBC    Neutrophils (Absolute) 2.76 2.00 - 7.15 K/uL    Lymphs (Absolute) 2.05 1.00 - 4.80 K/uL    Monos (Absolute) 0.46 0.00 - 0.85 K/uL    Eos (Absolute) 0.12 0.00 - 0.51 K/uL    Baso (Absolute) 0.04 0.00 - 0.12 K/uL    Immature Granulocytes (abs) 0.02 0.00 - 0.11 K/uL    NRBC (Absolute) 0.00 K/uL   Complete Metabolic Panel (CMP)   Result Value Ref Range    Sodium 142 135 - 145 mmol/L    Potassium 4.5 3.6 - 5.5 mmol/L    Chloride 110 96 - 112 mmol/L    Co2 22 20 - 33 mmol/L    Anion Gap 10.0 7.0 - 16.0    Glucose 98 65 - 99 mg/dL    Bun 24 (H) 8 - 22 mg/dL    Creatinine 0.81 0.50 - 1.40 mg/dL    Calcium 9.3 8.4 - 10.2 mg/dL    AST(SGOT) 32 12 - 45 U/L     ALT(SGPT) 26 2 - 50 U/L    Alkaline Phosphatase 43 30 - 99 U/L    Total Bilirubin 0.2 0.1 - 1.5 mg/dL    Albumin 4.1 3.2 - 4.9 g/dL    Total Protein 6.8 6.0 - 8.2 g/dL    Globulin 2.7 1.9 - 3.5 g/dL    A-G Ratio 1.5 g/dL   Troponin   Result Value Ref Range    Troponin T 9 6 - 19 ng/L   ESTIMATED GFR   Result Value Ref Range    GFR (CKD-EPI) 73 >60 mL/min/1.73 m 2   D-DIMER   Result Value Ref Range    D-Dimer Screen 0.55 (H) 0.00 - 0.50 ug/mL (FEU)   CoV-2, FLU A/B, and RSV by PCR (2-4 Hours CEPHEID) : Collect NP swab in VTM    Specimen: Respirate   Result Value Ref Range    Influenza virus A RNA Negative Negative    Influenza virus B, PCR Negative Negative    RSV, PCR Negative Negative    SARS-CoV-2 by PCR DETECTED (AA)     SARS-CoV-2 Source NP Swab    TROPONIN   Result Value Ref Range    Troponin T 7 6 - 19 ng/L   EKG   Result Value Ref Range    Report       [facility] Emergency Dept.    Test Date:  2022  Pt Name:    ELBERT ASHLEY                Department: [facility]  MRN:        [id number]                      Room:  Gender:     Female                       Technician: TANIA  :        [id number]                   Requested By:ER TRIAGE PROTOCOL  Order #:    [id number]                    Reading MD: ANGEL HEIN MD    Measurements  Intervals                                Axis  Rate:       74                           P:          71  UT:         180                          QRS:        44  QRSD:       78                           T:          47  QT:         380  QTc:        422    Interpretive Statements  SINUS RHYTHM, rate of 74, normal axis, no st elevation.  Compared to ECG 2021 12:03:42  No significant changes  Electronically Signed On 2022 22:26:58 PDT by ANGEL HEIN MD        All labs reviewed by me.    EKG  12 Lead EKG interpreted by me as shown above.    RADIOLOGY  CT-CTA CHEST PULMONARY ARTERY W/ RECONS   Final Result      1.  No evidence of pulmonary  embolism.   2.  No acute cardiopulmonary abnormality.   3.  Partially imaged gas at the right shoulder, likely between skin folds. Correlate for injury.   4.  1.4 cm low-density splenic lesion, statistically benign. Consider further, nonemergent evaluation as indicated.   5.  Partially visualized postsurgical changes of the stomach with small/moderate hiatal hernia.   6.  Atherosclerotic changes. Coronary artery atherosclerotic disease.   7.  Scattered pulmonary nodules measuring up to 4 mm in the right upper lobe. Follow-up recommended follow.      Low Risk: No routine follow-up      High Risk: Optional CT at 12 months      Comments: Use most suspicious nodule as guide to management. Follow-up intervals may vary according to size and risk.      Low Risk - Minimal or absent history of smoking and of other known risk factors.      High Risk - History of smoking or of other known risk factors.      Note: These recommendations do not apply to lung cancer screening, patients with immunosuppression, or patients with known primary cancer.      Fleischner Society 2017 Guidelines for Management of Incidentally Detected Pulmonary Nodules in Adults      DX-CHEST-PORTABLE (1 VIEW)   Final Result      No acute cardiac or pulmonary abnormalities are identified.        The radiologist's interpretation of all radiological studies have been reviewed by me.    COURSE & MEDICAL DECISION MAKING  Pertinent Labs & Imaging studies reviewed. (See chart for details)    8:18 PM - Patient seen and examined at bedside. Ordered EKG, DX-Chest, CT-CTA Chest Pulmonary Artery with, Troponin, CMP, CBC w/ Diff, Estimated GFR, and D-Dimer to evaluate her symptoms. The differential diagnoses include but are not limited to: myocardial infarction, pulmonary embolism, COVID-19, pneumonia, or chest wall pain.     Reexamined the patient's right upper shoulder there is no erythema or crepitus on exam I had a long discussion with the patient and her daughter  as to whether we should reimage to see if there is actual air in the tissues.  Given there is no signs of infection on the skin and she is not having any pain in that area or crepitus even if there was some subcutaneous air I do not think there would be any change in management except for continued observation of the site.  Therefore it is felt that imaging would not be beneficial tonight.  She is understanding that this could be early signs of an infection and if any sign of fever increasing pain redness or warmth to the area she will return.    Medical Decision Making: At this point time patient's troponins are negative her EKG is unremarkable I do not think she is having myocardial infarction.  I think her chest pain actually may be more related to possibly some gastroesophageal reflux with her hiatal hernia.  We will start her on Prilosec for this.  Patient's D-dimer was positive CTA of the chest does not show any pulmonary embolism.  She also is positive for COVID which may be contributing to her chest pain but I do not see any signs of a lobar pneumonia.  I will have the patient follow-up as an outpatient with cardiology.     The patient will return for new or worsening symptoms and is stable at the time of discharge.    The patient is referred to a primary physician for blood pressure management, diabetic screening, and for all other preventative health concerns.        DISPOSITION:  Patient will be discharged home in stable condition.    FOLLOW UP:  Phill De Jesus M.D.  [address] [facility] [id number] [phone]    Schedule an appointment as soon as possible for a visit in 1 week      Timothy Carrasco M.D.  [address] [place] [address]  [facility] [id number] [phone]    Schedule an appointment as soon as possible for a visit in 1 week  For follow up on your chest pain      OUTPATIENT MEDICATIONS:  Discharge Medication List as of 7/12/2022 10:40 PM        START taking these medications    Details    omeprazole (PRILOSEC) 20 MG delayed-release capsule Take 1 Capsule by mouth every day., Disp-30 Capsule, R-0, Normal               FINAL IMPRESSION  1. Chest pain, unspecified type    2. Gastroesophageal reflux disease, unspecified whether esophagitis present    3. COVID-19          Ines PANDYA (Vanessa), am scribing for, and in the presence of, Jason Corcoran M.D.    Electronically signed by: Ines Alves (Vanessa), 7/12/2022    Jason PANDYA M.D. personally performed the services described in this documentation, as scribed by Ines Alves in my presence, and it is both accurate and complete. C.     The note accurately reflects work and decisions made by me.  Jason Corcoran M.D.  7/12/2022  11:56 PM

## 2022-07-13 NOTE — DISCHARGE INSTRUCTIONS
You will need to remain in home quarantine until all three of the following are true:  You are 5 days from symptom onset,   your symptoms are improving   you have been fever free for at least  24 hours without taking any Tylenol or ibuprofen.  4.   you will have to wear a mask when around anyone else for 10 days from the onset of symptoms.  If you develop significant shortness of breath, meaning that it is difficult for you to walk even short distances without having to stop and catch your breath, or you become severely dizzy and this is persistent then please return to the emergency department.    I recommend you get a home pulse oximeter.  Return if your oxygenation is less than 90.

## 2022-11-09 ENCOUNTER — PATIENT MESSAGE (OUTPATIENT)
Dept: HEALTH INFORMATION MANAGEMENT | Facility: OTHER | Age: 82
End: 2022-11-09

## 2023-01-26 ENCOUNTER — APPOINTMENT (OUTPATIENT)
Dept: URGENT CARE | Facility: CLINIC | Age: 83
End: 2023-01-26
Payer: COMMERCIAL

## 2024-06-28 ENCOUNTER — APPOINTMENT (OUTPATIENT)
Dept: RADIOLOGY | Facility: MEDICAL CENTER | Age: 84
DRG: 481 | End: 2024-06-28
Attending: EMERGENCY MEDICINE
Payer: COMMERCIAL

## 2024-06-28 ENCOUNTER — HOSPITAL ENCOUNTER (INPATIENT)
Facility: MEDICAL CENTER | Age: 84
DRG: 481 | End: 2024-06-28
Attending: EMERGENCY MEDICINE | Admitting: STUDENT IN AN ORGANIZED HEALTH CARE EDUCATION/TRAINING PROGRAM
Payer: COMMERCIAL

## 2024-06-28 DIAGNOSIS — G89.29 CHRONIC BILATERAL LOW BACK PAIN WITH BILATERAL SCIATICA: ICD-10-CM

## 2024-06-28 DIAGNOSIS — E53.8 VITAMIN B 12 DEFICIENCY: ICD-10-CM

## 2024-06-28 DIAGNOSIS — E78.5 DYSLIPIDEMIA: ICD-10-CM

## 2024-06-28 DIAGNOSIS — M54.41 CHRONIC BILATERAL LOW BACK PAIN WITH BILATERAL SCIATICA: ICD-10-CM

## 2024-06-28 DIAGNOSIS — S72.141A CLOSED FRACTURE OF FEMUR, INTERTROCHANTERIC, RIGHT, INITIAL ENCOUNTER (HCC): ICD-10-CM

## 2024-06-28 DIAGNOSIS — E03.4 HYPOTHYROIDISM DUE TO ACQUIRED ATROPHY OF THYROID: ICD-10-CM

## 2024-06-28 DIAGNOSIS — E55.9 VITAMIN D DEFICIENCY: ICD-10-CM

## 2024-06-28 DIAGNOSIS — M80.00XK AGE-RELATED OSTEOPOROSIS WITH CURRENT PATHOLOGICAL FRACTURE WITH NONUNION: ICD-10-CM

## 2024-06-28 DIAGNOSIS — S72.141A CLOSED DISPLACED INTERTROCHANTERIC FRACTURE OF RIGHT FEMUR, INITIAL ENCOUNTER (HCC): ICD-10-CM

## 2024-06-28 DIAGNOSIS — M05.79 RHEUMATOID ARTHRITIS INVOLVING MULTIPLE SITES WITH POSITIVE RHEUMATOID FACTOR (HCC): ICD-10-CM
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
## 2024-06-28 DIAGNOSIS — M54.42 CHRONIC BILATERAL LOW BACK PAIN WITH BILATERAL SCIATICA: ICD-10-CM

## 2024-06-28 LAB
ALBUMIN SERPL BCP-MCNC: 3.4 G/DL (ref 3.2–4.9)
ALBUMIN/GLOB SERPL: 1.4 G/DL
ALP SERPL-CCNC: 42 U/L (ref 30–99)
ALT SERPL-CCNC: 17 U/L (ref 2–50)
ANION GAP SERPL CALC-SCNC: 10 MMOL/L (ref 7–16)
APTT PPP: 27.6 SEC (ref 24.7–36)
AST SERPL-CCNC: 24 U/L (ref 12–45)
BASOPHILS # BLD AUTO: 0.5 % (ref 0–1.8)
BASOPHILS # BLD: 0.03 K/UL (ref 0–0.12)
BILIRUB SERPL-MCNC: 0.2 MG/DL (ref 0.1–1.5)
BUN SERPL-MCNC: 25 MG/DL (ref 8–22)
CALCIUM ALBUM COR SERPL-MCNC: 9.2 MG/DL (ref 8.5–10.5)
CALCIUM SERPL-MCNC: 8.7 MG/DL (ref 8.5–10.5)
CHLORIDE SERPL-SCNC: 107 MMOL/L (ref 96–112)
CO2 SERPL-SCNC: 23 MMOL/L (ref 20–33)
CREAT SERPL-MCNC: 0.65 MG/DL (ref 0.5–1.4)
EKG IMPRESSION: NORMAL
EOSINOPHIL # BLD AUTO: 0.02 K/UL (ref 0–0.51)
EOSINOPHIL NFR BLD: 0.3 % (ref 0–6.9)
ERYTHROCYTE [DISTWIDTH] IN BLOOD BY AUTOMATED COUNT: 43.4 FL (ref 35.9–50)
GFR SERPLBLD CREATININE-BSD FMLA CKD-EPI: 88 ML/MIN/1.73 M 2
GLOBULIN SER CALC-MCNC: 2.4 G/DL (ref 1.9–3.5)
GLUCOSE SERPL-MCNC: 103 MG/DL (ref 65–99)
HCT VFR BLD AUTO: 31.8 % (ref 37–47)
HGB BLD-MCNC: 10.8 G/DL (ref 12–16)
IMM GRANULOCYTES # BLD AUTO: 0.09 K/UL (ref 0–0.11)
IMM GRANULOCYTES NFR BLD AUTO: 1.5 % (ref 0–0.9)
INR PPP: 1.03 (ref 0.87–1.13)
LYMPHOCYTES # BLD AUTO: 1.33 K/UL (ref 1–4.8)
LYMPHOCYTES NFR BLD: 22.3 % (ref 22–41)
MCH RBC QN AUTO: 34 PG (ref 27–33)
MCHC RBC AUTO-ENTMCNC: 34 G/DL (ref 32.2–35.5)
MCV RBC AUTO: 100 FL (ref 81.4–97.8)
MONOCYTES # BLD AUTO: 0.47 K/UL (ref 0–0.85)
MONOCYTES NFR BLD AUTO: 7.9 % (ref 0–13.4)
NEUTROPHILS # BLD AUTO: 4.03 K/UL (ref 1.82–7.42)
NEUTROPHILS NFR BLD: 67.5 % (ref 44–72)
NRBC # BLD AUTO: 0 K/UL
NRBC BLD-RTO: 0 /100 WBC (ref 0–0.2)
PLATELET # BLD AUTO: 224 K/UL (ref 164–446)
PMV BLD AUTO: 10.3 FL (ref 9–12.9)
POTASSIUM SERPL-SCNC: 4.2 MMOL/L (ref 3.6–5.5)
PROT SERPL-MCNC: 5.8 G/DL (ref 6–8.2)
PROTHROMBIN TIME: 13.6 SEC (ref 12–14.6)
RBC # BLD AUTO: 3.18 M/UL (ref 4.2–5.4)
SODIUM SERPL-SCNC: 140 MMOL/L (ref 135–145)
WBC # BLD AUTO: 6 K/UL (ref 4.8–10.8)

## 2024-06-28 PROCEDURE — A9270 NON-COVERED ITEM OR SERVICE: HCPCS | Performed by: STUDENT IN AN ORGANIZED HEALTH CARE EDUCATION/TRAINING PROGRAM

## 2024-06-28 PROCEDURE — 85730 THROMBOPLASTIN TIME PARTIAL: CPT

## 2024-06-28 PROCEDURE — 99285 EMERGENCY DEPT VISIT HI MDM: CPT

## 2024-06-28 PROCEDURE — 85610 PROTHROMBIN TIME: CPT

## 2024-06-28 PROCEDURE — 73552 X-RAY EXAM OF FEMUR 2/>: CPT | Mod: RT

## 2024-06-28 PROCEDURE — 71045 X-RAY EXAM CHEST 1 VIEW: CPT

## 2024-06-28 PROCEDURE — 36415 COLL VENOUS BLD VENIPUNCTURE: CPT

## 2024-06-28 PROCEDURE — 99223 1ST HOSP IP/OBS HIGH 75: CPT | Performed by: STUDENT IN AN ORGANIZED HEALTH CARE EDUCATION/TRAINING PROGRAM

## 2024-06-28 PROCEDURE — 96375 TX/PRO/DX INJ NEW DRUG ADDON: CPT

## 2024-06-28 PROCEDURE — 72170 X-RAY EXAM OF PELVIS: CPT

## 2024-06-28 PROCEDURE — 770001 HCHG ROOM/CARE - MED/SURG/GYN PRIV*

## 2024-06-28 PROCEDURE — 80053 COMPREHEN METABOLIC PANEL: CPT

## 2024-06-28 PROCEDURE — 93005 ELECTROCARDIOGRAM TRACING: CPT | Performed by: EMERGENCY MEDICINE

## 2024-06-28 PROCEDURE — 85025 COMPLETE CBC W/AUTO DIFF WBC: CPT

## 2024-06-28 PROCEDURE — 700111 HCHG RX REV CODE 636 W/ 250 OVERRIDE (IP): Mod: JZ | Performed by: STUDENT IN AN ORGANIZED HEALTH CARE EDUCATION/TRAINING PROGRAM

## 2024-06-28 PROCEDURE — 700111 HCHG RX REV CODE 636 W/ 250 OVERRIDE (IP): Performed by: EMERGENCY MEDICINE

## 2024-06-28 PROCEDURE — 700102 HCHG RX REV CODE 250 W/ 637 OVERRIDE(OP): Performed by: STUDENT IN AN ORGANIZED HEALTH CARE EDUCATION/TRAINING PROGRAM

## 2024-06-28 PROCEDURE — 96374 THER/PROPH/DIAG INJ IV PUSH: CPT

## 2024-06-28 RX ORDER — ALBUTEROL SULFATE 90 UG/1
2 AEROSOL, METERED RESPIRATORY (INHALATION) EVERY 4 HOURS PRN
Status: DISCONTINUED | OUTPATIENT
Start: 2024-06-28 | End: 2024-07-01 | Stop reason: HOSPADM

## 2024-06-28 RX ORDER — ONDANSETRON 2 MG/ML
4 INJECTION INTRAMUSCULAR; INTRAVENOUS ONCE
Status: COMPLETED | OUTPATIENT
Start: 2024-06-28 | End: 2024-06-28

## 2024-06-28 RX ORDER — CELECOXIB 200 MG/1
200 CAPSULE ORAL DAILY
COMMUNITY

## 2024-06-28 RX ORDER — HYDROMORPHONE HYDROCHLORIDE 1 MG/ML
0.5 INJECTION, SOLUTION INTRAMUSCULAR; INTRAVENOUS; SUBCUTANEOUS
Status: DISCONTINUED | OUTPATIENT
Start: 2024-06-28 | End: 2024-06-28

## 2024-06-28 RX ORDER — CALCIUM CARBONATE 500 MG/1
500 TABLET, CHEWABLE ORAL DAILY
Status: DISCONTINUED | OUTPATIENT
Start: 2024-06-28 | End: 2024-07-01 | Stop reason: HOSPADM

## 2024-06-28 RX ORDER — OXYCODONE HYDROCHLORIDE 10 MG/1
10 TABLET ORAL
Status: DISCONTINUED | OUTPATIENT
Start: 2024-06-28 | End: 2024-06-28

## 2024-06-28 RX ORDER — ACETAMINOPHEN 500 MG
1000 TABLET ORAL EVERY 6 HOURS
Status: DISCONTINUED | OUTPATIENT
Start: 2024-06-28 | End: 2024-07-01 | Stop reason: HOSPADM

## 2024-06-28 RX ORDER — HYDROMORPHONE HYDROCHLORIDE 1 MG/ML
0.25 INJECTION, SOLUTION INTRAMUSCULAR; INTRAVENOUS; SUBCUTANEOUS
Status: DISCONTINUED | OUTPATIENT
Start: 2024-06-28 | End: 2024-07-01 | Stop reason: HOSPADM

## 2024-06-28 RX ORDER — LEVOTHYROXINE SODIUM 88 UG/1
88 TABLET ORAL
COMMUNITY

## 2024-06-28 RX ORDER — ONDANSETRON 4 MG/1
4 TABLET, ORALLY DISINTEGRATING ORAL EVERY 4 HOURS PRN
Status: DISCONTINUED | OUTPATIENT
Start: 2024-06-28 | End: 2024-07-01 | Stop reason: HOSPADM

## 2024-06-28 RX ORDER — CELECOXIB 200 MG/1
200 CAPSULE ORAL DAILY
Status: DISCONTINUED | OUTPATIENT
Start: 2024-06-28 | End: 2024-07-01 | Stop reason: HOSPADM

## 2024-06-28 RX ORDER — ALBUTEROL SULFATE 90 UG/1
2 AEROSOL, METERED RESPIRATORY (INHALATION) EVERY 6 HOURS PRN
COMMUNITY

## 2024-06-28 RX ORDER — ACETAMINOPHEN 500 MG
1000 TABLET ORAL EVERY 6 HOURS PRN
COMMUNITY

## 2024-06-28 RX ORDER — ALENDRONATE SODIUM 70 MG/1
70 TABLET ORAL
COMMUNITY

## 2024-06-28 RX ORDER — ACETAMINOPHEN 500 MG
1000 TABLET ORAL EVERY 6 HOURS PRN
Status: DISCONTINUED | OUTPATIENT
Start: 2024-07-03 | End: 2024-07-01 | Stop reason: HOSPADM

## 2024-06-28 RX ORDER — TRAZODONE HYDROCHLORIDE 50 MG/1
50 TABLET ORAL
Status: DISCONTINUED | OUTPATIENT
Start: 2024-06-28 | End: 2024-07-01 | Stop reason: HOSPADM

## 2024-06-28 RX ORDER — ROSUVASTATIN CALCIUM 20 MG/1
20 TABLET, COATED ORAL DAILY
COMMUNITY

## 2024-06-28 RX ORDER — OXYCODONE HYDROCHLORIDE 5 MG/1
5 TABLET ORAL
Status: DISCONTINUED | OUTPATIENT
Start: 2024-06-28 | End: 2024-06-28

## 2024-06-28 RX ORDER — LANOLIN ALCOHOL/MO/W.PET/CERES
400 CREAM (GRAM) TOPICAL DAILY
Status: DISCONTINUED | OUTPATIENT
Start: 2024-06-29 | End: 2024-07-01 | Stop reason: HOSPADM

## 2024-06-28 RX ORDER — OXYCODONE HYDROCHLORIDE 5 MG/1
2.5 TABLET ORAL
Status: DISCONTINUED | OUTPATIENT
Start: 2024-06-28 | End: 2024-07-01 | Stop reason: HOSPADM

## 2024-06-28 RX ORDER — ONDANSETRON 2 MG/ML
4 INJECTION INTRAMUSCULAR; INTRAVENOUS EVERY 4 HOURS PRN
Status: DISCONTINUED | OUTPATIENT
Start: 2024-06-28 | End: 2024-07-01 | Stop reason: HOSPADM

## 2024-06-28 RX ORDER — MORPHINE SULFATE 4 MG/ML
2 INJECTION INTRAVENOUS ONCE
Status: COMPLETED | OUTPATIENT
Start: 2024-06-28 | End: 2024-06-28

## 2024-06-28 RX ORDER — CEPHALEXIN 500 MG/1
500 CAPSULE ORAL 4 TIMES DAILY
Status: SHIPPED | COMMUNITY
End: 2024-06-28

## 2024-06-28 RX ORDER — ROSUVASTATIN CALCIUM 20 MG/1
20 TABLET, COATED ORAL EVERY EVENING
Status: DISCONTINUED | OUTPATIENT
Start: 2024-06-28 | End: 2024-07-01 | Stop reason: HOSPADM

## 2024-06-28 RX ORDER — ENOXAPARIN SODIUM 100 MG/ML
40 INJECTION SUBCUTANEOUS DAILY
Status: DISCONTINUED | OUTPATIENT
Start: 2024-06-28 | End: 2024-07-01 | Stop reason: HOSPADM

## 2024-06-28 RX ORDER — OXYCODONE HYDROCHLORIDE 5 MG/1
5 TABLET ORAL
Status: DISCONTINUED | OUTPATIENT
Start: 2024-06-28 | End: 2024-07-01 | Stop reason: HOSPADM

## 2024-06-28 RX ORDER — CELECOXIB 200 MG/1
200 CAPSULE ORAL
Status: DISCONTINUED | OUTPATIENT
Start: 2024-07-03 | End: 2024-07-01 | Stop reason: HOSPADM

## 2024-06-28 RX ORDER — LEVOTHYROXINE SODIUM 88 UG/1
88 TABLET ORAL
Status: DISCONTINUED | OUTPATIENT
Start: 2024-06-29 | End: 2024-07-01 | Stop reason: HOSPADM

## 2024-06-28 RX ADMIN — ACETAMINOPHEN 1000 MG: 500 TABLET ORAL at 17:31

## 2024-06-28 RX ADMIN — MORPHINE SULFATE 2 MG: 4 INJECTION INTRAVENOUS at 16:49

## 2024-06-28 RX ADMIN — OXYCODONE 5 MG: 5 TABLET ORAL at 23:53

## 2024-06-28 RX ADMIN — OXYCODONE 5 MG: 5 TABLET ORAL at 20:08

## 2024-06-28 RX ADMIN — ENOXAPARIN SODIUM 40 MG: 100 INJECTION SUBCUTANEOUS at 17:28

## 2024-06-28 RX ADMIN — CELECOXIB 200 MG: 200 CAPSULE ORAL at 17:35

## 2024-06-28 RX ADMIN — ACETAMINOPHEN 1000 MG: 500 TABLET ORAL at 23:54

## 2024-06-28 RX ADMIN — ONDANSETRON 4 MG: 2 INJECTION INTRAMUSCULAR; INTRAVENOUS at 16:49

## 2024-06-28 RX ADMIN — TRAZODONE HYDROCHLORIDE 50 MG: 50 TABLET ORAL at 21:49

## 2024-06-28 ASSESSMENT — ENCOUNTER SYMPTOMS
DIARRHEA: 0
VOMITING: 0
CHILLS: 0
HEADACHES: 0
NECK PAIN: 0
BACK PAIN: 0
MYALGIAS: 0
BLOOD IN STOOL: 0
NERVOUS/ANXIOUS: 0
FLANK PAIN: 0
CONSTIPATION: 1
NAUSEA: 0
ABDOMINAL PAIN: 0
FEVER: 0
DEPRESSION: 0

## 2024-06-28 ASSESSMENT — FIBROSIS 4 INDEX: FIB4 SCORE: 2.06

## 2024-06-28 ASSESSMENT — PAIN DESCRIPTION - PAIN TYPE
TYPE: ACUTE PAIN

## 2024-06-28 NOTE — ED TRIAGE NOTES
Chief Complaint   Patient presents with    GLF     BIB EMS from home, pt had GLF, tripped on piece of concrete -LOC, -thinners. EMS reported R leg shortening on arrival. Pt reports pain to the R inner thigh/pelvic region.      Vitals:    06/28/24 1425   BP: 115/58   Pulse: 87   Resp: 12   Temp: 37.1 °C (98.8 °F)   SpO2: 91%     Pt placed in gown, connected to monitoring equipment. PIV established by EMS received 100 fentanyl PTA. GCS 15 on arrival. Notable R leg shortening on arrival. Chart up for ERP.

## 2024-06-28 NOTE — ED PROVIDER NOTES
ED Provider Note    CHIEF COMPLAINT  Chief Complaint   Patient presents with    GLF     BIB EMS from home, pt had GLF, tripped on piece of concrete -LOC, -thinners. EMS reported R leg shortening on arrival. Pt reports pain to the R inner thigh/pelvic region.        EXTERNAL RECORDS REVIEWED  External ED Note patient was seen at [facility] emergency department 7/12/2022 for evaluation for chest pain she was found to be positive for COVID CT of the chest showed no pulmonary embolism she was discharged home to follow-up with her primary care physician    GARRET/BRANDI  LIMITATION TO HISTORY   Select: : None  OUTSIDE HISTORIAN(S):  Family states that she just had breast augmentation removal and is bruised around her chest but had just come from her postop appointment and she was deemed to be doing well    Tayla Molina is a 84 y.o. female who presents with right hip pain and inability to walk after having a mechanical fall prior to arrival where she tripped on a crack in the cement and fell on her right leg.  She denies any head injury or loss of conscious.  She has no chest pain abdominal pain neck pain or headache.  She does have some low back pain and has a history of previous surgery on her back as well.  She denies any numbness or tingling in her extremities.  She does not take any blood thinning medications.    PAST MEDICAL HISTORY   has a past medical history of Arthritis, Hypothyroidism, Osteoporosis, Pneumonia (2000), Psychiatric problem, Sleep apnea, Snoring, and Stroke (HCC).    SURGICAL HISTORY   has a past surgical history that includes dental extraction(s); primary c section; gastric bypass laparoscopic (N/A, 2004); mastectomy (Bilateral, 1974); abdominal exploration (2004); kishan by laparoscopy (2002); vaginal hysterectomy total (1972); laminotomy (2012); and total knee arthroplasty (Left, 3/4/2021).    FAMILY HISTORY  Family History   Problem Relation Age of Onset    Hypertension Father   "      SOCIAL HISTORY  Social History     Tobacco Use    Smoking status: Former     Current packs/day: 0.00     Types: Cigarettes     Start date:      Quit date: 1987     Years since quittin.9    Smokeless tobacco: Never   Vaping Use    Vaping status: Never Used   Substance and Sexual Activity    Alcohol use: Not Currently    Drug use: Never    Sexual activity: Not Currently       CURRENT MEDICATIONS  Home Medications       Reviewed by Leslie Samuel R.N. (Registered Nurse) on 24 at 1430  Med List Status: Partial     Medication Last Dose Status   aspirin EC (ECOTRIN) 81 MG Tablet Delayed Response  Active   CALCIUM PO  Active   famciclovir (FAMVIR) 250 MG Tab  Active   levothyroxine (SYNTHROID) 100 MCG Tab  Active   meloxicam (MOBIC) 15 MG tablet  Active   methylPREDNISolone (MEDROL DOSEPAK) 4 MG Tablet Therapy Pack  Active   Multiple Vitamin (MULTIVITAMIN PO)  Active   omeprazole (PRILOSEC) 20 MG delayed-release capsule  Active   oxyCODONE-acetaminophen (PERCOCET) 5-325 MG Tab  Active   Riboflavin (VITAMIN B-2) 100 MG Tab  Active   Tofacitinib Citrate (XELJANZ) 5 MG Tab  Active   topiramate (TOPAMAX) 25 MG Tab  Active   traZODone (DESYREL) 50 MG Tab  Active   XELJANZ XR 11 MG TABLET SR 24 HR  Active                  Audit from Redirected Encounters    **Home medications have not yet been reviewed for this encounter**         ALLERGIES  Allergies   Allergen Reactions    Food Anaphylaxis     Kiwi    Codeine Vomiting    Lyrica      Dizziness and ankle edema       PHYSICAL EXAM  VITAL SIGNS: /58   Pulse 85   Temp 37.1 °C (98.8 °F) (Temporal)   Resp 16   Ht 1.549 m (5' 1\")   Wt 66.7 kg (147 lb)   SpO2 93%   BMI 27.78 kg/m²      Constitutional: Well developed, Well nourished, mild distress, Non-toxic appearance.   HEENT: Normocephalic, Atraumatic,  external ears normal, pharynx pink,  Mucous  Membranes moist, No rhinorrhea or mucosal edema  Eyes: PERRL, EOMI, Conjunctiva normal, No " discharge.   Neck: Normal range of motion, No tenderness, Supple, No stridor.   Lymphatic: No lymphadenopathy    Cardiovascular: Regular Rate and Rhythm, No murmurs,  rubs, or gallops.   Thorax & Lungs: Lungs clear to auscultation bilaterally, No respiratory distress, No wheezes, rhales or rhonchi, No chest wall tenderness.   Abdomen: Bowel sounds normal, Soft, non tender, non distended,  No pulsatile masses., no rebound guarding or peritoneal signs.   Skin: Warm, Dry, No erythema, No rash,   Back:  No CVA tenderness,  No spinal tenderness, bony crepitance step offs or instability.   Extremities: Equal, intact distal pulses, No cyanosis, clubbing or edema, patient has right hip tenderness to palpation she is unable to lift her right leg off the bed she does have normal sensation in her right foot and is able to wiggle her toes.  Musculoskeletal: Good range of motion in all major joints except for her right hip which she cannot move because of pain and deformity. No tenderness to palpation or major deformities noted.   Neurologic: Alert & oriented No focal deficits noted.  Psychiatric: Affect normal, Judgment normal, Mood normal.      EKG/LABS  Results for orders placed or performed during the hospital encounter of 06/28/24   CBC w/ Differential   Result Value Ref Range    WBC 6.0 4.8 - 10.8 K/uL    RBC 3.18 (L) 4.20 - 5.40 M/uL    Hemoglobin 10.8 (L) 12.0 - 16.0 g/dL    Hematocrit 31.8 (L) 37.0 - 47.0 %    .0 (H) 81.4 - 97.8 fL    MCH 34.0 (H) 27.0 - 33.0 pg    MCHC 34.0 32.2 - 35.5 g/dL    RDW 43.4 35.9 - 50.0 fL    Platelet Count 224 164 - 446 K/uL    MPV 10.3 9.0 - 12.9 fL    Neutrophils-Polys 67.50 44.00 - 72.00 %    Lymphocytes 22.30 22.00 - 41.00 %    Monocytes 7.90 0.00 - 13.40 %    Eosinophils 0.30 0.00 - 6.90 %    Basophils 0.50 0.00 - 1.80 %    Immature Granulocytes 1.50 (H) 0.00 - 0.90 %    Nucleated RBC 0.00 0.00 - 0.20 /100 WBC    Neutrophils (Absolute) 4.03 1.82 - 7.42 K/uL    Lymphs (Absolute)  1.33 1.00 - 4.80 K/uL    Monos (Absolute) 0.47 0.00 - 0.85 K/uL    Eos (Absolute) 0.02 0.00 - 0.51 K/uL    Baso (Absolute) 0.03 0.00 - 0.12 K/uL    Immature Granulocytes (abs) 0.09 0.00 - 0.11 K/uL    NRBC (Absolute) 0.00 K/uL   EKG (NOW)   Result Value Ref Range    Report       [facility] Emergency Dept.    Test Date:  2024  Pt Name:    ELBERT ASHLEY                Department: ER  MRN:        [id number]                      Room:       [facility]  Gender:     Female                       Technician: [id number]  :        [id number]                   Requested By:CHAR LOU  Order #:    [id number]                    Reading MD: CHAR LOU MD    Measurements  Intervals                                Axis  Rate:       89                           P:          77  VA:         198                          QRS:        32  QRSD:       87                           T:          35  QT:         369  QTc:        449    Interpretive Statements  Sinus rhythm  Low voltage, precordial leads  Compared to ECG 2022 15:47:01  Low QRS voltage now present  ST (T wave) deviation no longer present  Electronically Signed On 2024 15:33:51 PDT by CHAR LOU MD         I have independently interpreted this EKG    RADIOLOGY/PROCEDURES   I have independently interpreted the diagnostic imaging associated with this visit and am waiting the final reading from the radiologist.   My preliminary interpretation is as follows:xray hip right intertrochanteric hip fracture    Radiologist interpretation:  DX-CHEST-LIMITED (1 VIEW)   Final Result      No acute cardiac or pulmonary abnormalities are identified.      DX-FEMUR-2+ RIGHT   Final Result      Acute three-part right hip intertrochanteric fracture.      DX-PELVIS-1 OR 2 VIEWS   Final Result      Acute comminuted 3 part right-sided intertrochanteric hip fracture.          COURSE & MEDICAL DECISION MAKING    ASSESSMENT, COURSE AND PLAN  Care Narrative: Elbert  Arabella Molina is a 84 y.o. female who presents with right hip pain and inability to walk after having a mechanical fall prior to arrival where she tripped on a crack in the cement and fell on her right leg.  She denies any head injury or loss of conscious.  She has no chest pain abdominal pain neck pain or headache.  She does have some low back pain and has a history of previous surgery on her back as well.  She denies any numbness or tingling in her extremities.  She does not take any blood thinning medications.  On physical exam she is alert awake she is in no acute distress she has tenderness to her right hip with shortening of the right leg she does have sensation distally she is unable to lift her right leg off of the bed.            ADDITIONAL PROBLEMS MANAGED  none    DISPOSITION AND DISCUSSIONS    Patient's x-ray of the right hip shows a comminuted three-part right intratrochanteric hip fracture.  Her chest x-ray shows no cardiac or pulmonary abnormalities.  X-ray of the pelvis shows the intratrochanteric EKG shows sinus rhythm without ectopy or abnormality.  The patient's last meal was at 11:00am this morning and she does not take any blood thinning medications.    Patient's white count is normal at 6 hemoglobin 10.8 platelet count 224 with a normal differential she has 1.5 immature granulocytes.      I spoke with the orthopedist so he can schedule surgical repair of this fracture.    I spoke with the hospitalist who admit the patient for pain control and further care.    I have discussed management of the patient with the following physicians and DEANDRA's:   Orthopedist Dr. Peng   Hospitalist Dr Mcnally    Discussion of management with other [facility] or appropriate source(s): None     Escalation of care considered, and ultimately not performed: none    Barriers to care at this time, including but not limited to:  none.     Decision tools and prescription drugs considered including, but not limited to: Pain  Medications morphine .    She will be admitted in guarded condition.  FINAL DIAGNOSIS  1. Fall, initial encounter    2. Closed displaced intertrochanteric fracture of right femur, initial encounter ([facility])           Electronically signed by: Milly Nunez M.D., 6/28/2024 2:52 PM

## 2024-06-28 NOTE — H&P
Hospital Medicine History & Physical Note    Date of Service  6/28/2024    Primary Care Physician  Phill De Jesus M.D.    Consultants  orthopedics    Specialist Names: Dr. Peng    Code Status  Full Code    Chief Complaint  Chief Complaint   Patient presents with    GLF     BIB EMS from home, pt had GLF, tripped on piece of concrete -LOC, -thinners. EMS reported R leg shortening on arrival. Pt reports pain to the R inner thigh/pelvic region.        History of Presenting Illness  Tayla Molina is a 84 y.o. female with RA and hypothyroidism who presented 6/28/2024 with Right intertrochanteric fracture.    She was out in the yard replacing soda lights today when she tripped on the concrete and landed on her Right side. She denies any prodromal symptoms nor loss of consciousness. Her hip pain is currently 5/10. She had recent constipation from keflex but it has resolved with benefiber.    In the ED she presented with normal vital signs. Hip XR and pelvis XR demonstrate a 3-part intertrochanteric fracture. She received 2 mg IV morphine and 4 mg IV zofran. CXR no acute abnormality. EKG NSR. CBC demonstrates normocytc anemia Hgb 10.8. CMP is normal. INR and PTT normal.  Orthopedics Dr. Peng was consulted by EDP and recommends surgery tomorrow with Dr. Velasco. Ok to start LMWH this evening.    I discussed the plan of care with patient, family, bedside RN, and orthopedics and ED provider .    Review of Systems  Review of Systems   Constitutional:  Negative for chills, fever and malaise/fatigue.   Gastrointestinal:  Positive for constipation. Negative for abdominal pain, blood in stool, diarrhea, melena, nausea and vomiting.   Genitourinary:  Negative for dysuria, flank pain and hematuria.   Musculoskeletal:  Negative for back pain, joint pain, myalgias and neck pain.   Neurological:  Negative for headaches.   Psychiatric/Behavioral:  Negative for depression. The patient is not nervous/anxious.         Past Medical History   has a past medical history of Arthritis, Hypothyroidism, Osteoporosis, Pneumonia (2000), Psychiatric problem, Sleep apnea, Snoring, and Stroke (HCC).    Surgical History   has a past surgical history that includes dental extraction(s); primary c section; gastric bypass laparoscopic (N/A, 2004); mastectomy (Bilateral, 1974); abdominal exploration (2004); kishan by laparoscopy (2002); vaginal hysterectomy total (1972); laminotomy (2012); and pr total knee arthroplasty (Left, 3/4/2021).     Family History  family history includes Hypertension in her father.   Family history reviewed with patient. There is no family history that is pertinent to the chief complaint.     Social History   reports that she quit smoking about 36 years ago. Her smoking use included cigarettes. She started smoking about 74 years ago. She has never used smokeless tobacco. She reports that she does not currently use alcohol. She reports that she does not use drugs.    Allergies  Allergies   Allergen Reactions    Kiwi Extract Anaphylaxis    Pineapple Anaphylaxis    Codeine Vomiting    Lyrica      Dizziness and ankle edema       Medications  Prior to Admission Medications   Prescriptions Last Dose Informant Patient Reported? Taking?   CALCIUM PO 6/28/2024 at am Patient, Family Member Yes Yes   Sig: Take 1 Tablet by mouth every day.   MAGNESIUM OXIDE PO 6/27/2024 at pm Patient, Family Member Yes Yes   Sig: Take 1 Tablet by mouth every day.   Multiple Vitamin (MULTIVITAMIN PO) 6/28/2024 at am Patient, Family Member Yes Yes   Sig: Take 1 Tablet by mouth every day.   Riboflavin (VITAMIN B-2) 100 MG Tab 6/27/2024 at pm Patient, Family Member Yes Yes   Sig: Take 400 mg by mouth every day.   TURMERIC PO 6/28/2024 at am Patient, Family Member Yes Yes   Sig: Take 1 Capsule by mouth 2 times a day.   XELJANZ XR 11 MG TABLET SR 24 HR 6/28/2024 at am Patient, Family Member Yes Yes   Sig: Take 11 mg by mouth every day.    acetaminophen (TYLENOL) 500 MG Tab 6/28/2024 at am Patient, Family Member Yes Yes   Sig: Take 1,000 mg by mouth every 6 hours as needed for Mild Pain. 2 tablets=1000mg   albuterol 108 (90 Base) MCG/ACT Aero Soln inhalation aerosol prn at unk Patient, Family Member Yes Yes   Sig: Inhale 2 Puffs every 6 hours as needed for Shortness of Breath.   alendronate (FOSAMAX) 70 MG Tab 6/24/2024 at unk Patient, Family Member Yes No   Sig: Take 70 mg by mouth every 7 days.   celecoxib (CELEBREX) 200 MG Cap 6/28/2024 at am Patient, Family Member Yes Yes   Sig: Take 200 mg by mouth every day.   levothyroxine (SYNTHROID) 88 MCG Tab 6/28/2024 at am Patient, Family Member Yes Yes   Sig: Take 88 mcg by mouth every morning on an empty stomach.   rosuvastatin (CRESTOR) 20 MG Tab 6/27/2024 at pm Patient, Family Member Yes Yes   Sig: Take 20 mg by mouth every day.   traZODone (DESYREL) 50 MG Tab 6/27/2024 at hs Patient, Family Member No Yes   Sig: Take 1 Tab by mouth every bedtime.      Facility-Administered Medications: None       Physical Exam  Temp:  [37.1 °C (98.8 °F)] 37.1 °C (98.8 °F)  Pulse:  [85-89] 89  Resp:  [12-16] 16  BP: (115-116)/(58) 116/58  SpO2:  [91 %-94 %] 94 %  Blood Pressure : 116/58   Temperature: 37.1 °C (98.8 °F)   Pulse: 89   Respiration: 16   Pulse Oximetry: 94 %       Physical Exam  Vitals and nursing note reviewed. Exam conducted with a chaperone present (Daughter and granddaughter at bedside).   Constitutional:       General: She is not in acute distress.     Appearance: She is not ill-appearing, toxic-appearing or diaphoretic.   HENT:      Head: Normocephalic.      Nose: Nose normal.      Mouth/Throat:      Mouth: Mucous membranes are moist.   Eyes:      General: No scleral icterus.     Conjunctiva/sclera: Conjunctivae normal.   Cardiovascular:      Rate and Rhythm: Normal rate and regular rhythm.      Pulses: Normal pulses.      Heart sounds: Normal heart sounds. No murmur heard.     No friction rub. No  "gallop.   Pulmonary:      Effort: Pulmonary effort is normal. No respiratory distress.      Breath sounds: Normal breath sounds. No wheezing, rhonchi or rales.   Abdominal:      General: Abdomen is flat. Bowel sounds are normal. There is no distension.      Palpations: Abdomen is soft.      Tenderness: There is no abdominal tenderness. There is no guarding or rebound.   Genitourinary:     Comments: No levy  Musculoskeletal:      Cervical back: Neck supple.      Right lower leg: No edema.      Left lower leg: No edema.      Comments: RLE pain elicited with internal rotation   Skin:     General: Skin is warm and dry.   Neurological:      Mental Status: She is alert.      Motor: No weakness (5/5 BLE dorsiflexion / plantarflexion).   Psychiatric:         Mood and Affect: Mood normal.         Behavior: Behavior normal.         Thought Content: Thought content normal.         Judgment: Judgment normal.         Laboratory:  Recent Labs     06/28/24  1545   WBC 6.0   RBC 3.18*   HEMOGLOBIN 10.8*   HEMATOCRIT 31.8*   .0*   MCH 34.0*   MCHC 34.0   RDW 43.4   PLATELETCT 224   MPV 10.3     Recent Labs     06/28/24  1545   SODIUM 140   POTASSIUM 4.2   CHLORIDE 107   CO2 23   GLUCOSE 103*   BUN 25*   CREATININE 0.65   CALCIUM 8.7     Recent Labs     06/28/24  1545   ALTSGPT 17   ASTSGOT 24   ALKPHOSPHAT 42   TBILIRUBIN 0.2   GLUCOSE 103*     Recent Labs     06/28/24  1545   APTT 27.6   INR 1.03     No results for input(s): \"NTPROBNP\" in the last 72 hours.      No results for input(s): \"TROPONINT\" in the last 72 hours.    Imaging:  DX-CHEST-LIMITED (1 VIEW)   Final Result      No acute cardiac or pulmonary abnormalities are identified.      DX-FEMUR-2+ RIGHT   Final Result      Acute three-part right hip intertrochanteric fracture.      DX-PELVIS-1 OR 2 VIEWS   Final Result      Acute comminuted 3 part right-sided intertrochanteric hip fracture.          X-Ray:  I have personally reviewed the images and compared with prior " images. and My impression is: No acute abnormality  EKG:  I have personally reviewed the images and compared with prior images. and My impression is: NSR Qtc 449    Assessment/Plan:  Justification for Admission Status  I anticipate this patient will require at least two midnights for appropriate medical management, necessitating inpatient admission because Right hip fracture, to OR tomorrow and then PT/OT due to below functional baseline. Requires pain management with combination PO/IV opiates.    Patient will need a Med/Surg bed on MEDICAL service .  The need is secondary to pain management, orthopedic intervention.    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- (present on admission)  Assessment & Plan  Holding alendronate in acute setting  Continue vit D / Ca    Vitamin D deficiency- (present on admission)  Assessment & Plan  Increased calcium / VitD    Primary insomnia- (present on admission)  Assessment & Plan  Continue trazodone QHS    Hypothyroidism due to acquired atrophy of thyroid- (present on admission)  Assessment & Plan  Continue levothyroxine    Rheumatoid arthritis involving multiple sites with positive rheumatoid factor (HCC)- (present on admission)  Assessment & Plan  Continue celecoxib  Holding xeljanz in acute setting      VTE prophylaxis: SCDs/TEDs and enoxaparin ppx

## 2024-06-28 NOTE — ED NOTES
Medication history reviewed with patient & patients family at bedside.   Med rec is complete  Allergies reviewed.     Patient was prescribed a 7 day course of Keflex 500mg qid- this was completed on 6/26/24 per patient.  Anticoagulants: No    Kenneth Sinha

## 2024-06-28 NOTE — ED NOTES
Report to CHARLIE Dong.     Pt to T3 via pt transport. Family at bedside. VSS on room air. Pt has all belongings at time of transfer. No belongings left in room after transfer.

## 2024-06-28 NOTE — ASSESSMENT & PLAN NOTE
Mechanical fall without prodrome  S/p ORIF right IT fracture with intramedullary nailing on 6/29.  LMWH for VTE prophylaxis - ok preop per Orthopedics  Scheduled APAP and celecoxib, cold compress, PRN oxycodone / IV dilaudid  PT/OT post-operative for post-acute assessment

## 2024-06-29 ENCOUNTER — ANESTHESIA (OUTPATIENT)
Dept: SURGERY | Facility: MEDICAL CENTER | Age: 84
End: 2024-06-29
Payer: COMMERCIAL

## 2024-06-29 ENCOUNTER — APPOINTMENT (OUTPATIENT)
Dept: RADIOLOGY | Facility: MEDICAL CENTER | Age: 84
DRG: 481 | End: 2024-06-29
Attending: STUDENT IN AN ORGANIZED HEALTH CARE EDUCATION/TRAINING PROGRAM
Payer: COMMERCIAL

## 2024-06-29 ENCOUNTER — ANESTHESIA EVENT (OUTPATIENT)
Dept: SURGERY | Facility: MEDICAL CENTER | Age: 84
End: 2024-06-29
Payer: COMMERCIAL

## 2024-06-29 PROCEDURE — 770006 HCHG ROOM/CARE - MED/SURG/GYN SEMI*

## 2024-06-29 PROCEDURE — 27245 TREAT THIGH FRACTURE: CPT | Mod: RT | Performed by: STUDENT IN AN ORGANIZED HEALTH CARE EDUCATION/TRAINING PROGRAM

## 2024-06-29 PROCEDURE — 110371 HCHG SHELL REV 272: Performed by: STUDENT IN AN ORGANIZED HEALTH CARE EDUCATION/TRAINING PROGRAM

## 2024-06-29 PROCEDURE — 502240 HCHG MISC OR SUPPLY RC 0272: Performed by: STUDENT IN AN ORGANIZED HEALTH CARE EDUCATION/TRAINING PROGRAM

## 2024-06-29 PROCEDURE — 700111 HCHG RX REV CODE 636 W/ 250 OVERRIDE (IP): Mod: JZ | Performed by: ANESTHESIOLOGY

## 2024-06-29 PROCEDURE — 99222 1ST HOSP IP/OBS MODERATE 55: CPT | Mod: 57 | Performed by: STUDENT IN AN ORGANIZED HEALTH CARE EDUCATION/TRAINING PROGRAM

## 2024-06-29 PROCEDURE — 160009 HCHG ANES TIME/MIN: Performed by: STUDENT IN AN ORGANIZED HEALTH CARE EDUCATION/TRAINING PROGRAM

## 2024-06-29 PROCEDURE — 64447 NJX AA&/STRD FEMORAL NRV IMG: CPT | Performed by: STUDENT IN AN ORGANIZED HEALTH CARE EDUCATION/TRAINING PROGRAM

## 2024-06-29 PROCEDURE — C1713 ANCHOR/SCREW BN/BN,TIS/BN: HCPCS | Performed by: STUDENT IN AN ORGANIZED HEALTH CARE EDUCATION/TRAINING PROGRAM

## 2024-06-29 PROCEDURE — 700111 HCHG RX REV CODE 636 W/ 250 OVERRIDE (IP): Performed by: ANESTHESIOLOGY

## 2024-06-29 PROCEDURE — 160029 HCHG SURGERY MINUTES - 1ST 30 MINS LEVEL 4: Performed by: STUDENT IN AN ORGANIZED HEALTH CARE EDUCATION/TRAINING PROGRAM

## 2024-06-29 PROCEDURE — 160002 HCHG RECOVERY MINUTES (STAT): Performed by: STUDENT IN AN ORGANIZED HEALTH CARE EDUCATION/TRAINING PROGRAM

## 2024-06-29 PROCEDURE — 99233 SBSQ HOSP IP/OBS HIGH 50: CPT | Performed by: HOSPITALIST

## 2024-06-29 PROCEDURE — A9270 NON-COVERED ITEM OR SERVICE: HCPCS | Performed by: ANESTHESIOLOGY

## 2024-06-29 PROCEDURE — 160035 HCHG PACU - 1ST 60 MINS PHASE I: Performed by: STUDENT IN AN ORGANIZED HEALTH CARE EDUCATION/TRAINING PROGRAM

## 2024-06-29 PROCEDURE — 700102 HCHG RX REV CODE 250 W/ 637 OVERRIDE(OP): Performed by: STUDENT IN AN ORGANIZED HEALTH CARE EDUCATION/TRAINING PROGRAM

## 2024-06-29 PROCEDURE — 0QS604Z REPOSITION RIGHT UPPER FEMUR WITH INTERNAL FIXATION DEVICE, OPEN APPROACH: ICD-10-PCS | Performed by: STUDENT IN AN ORGANIZED HEALTH CARE EDUCATION/TRAINING PROGRAM

## 2024-06-29 PROCEDURE — A9270 NON-COVERED ITEM OR SERVICE: HCPCS | Performed by: STUDENT IN AN ORGANIZED HEALTH CARE EDUCATION/TRAINING PROGRAM

## 2024-06-29 PROCEDURE — 73502 X-RAY EXAM HIP UNI 2-3 VIEWS: CPT | Mod: RT

## 2024-06-29 PROCEDURE — 700101 HCHG RX REV CODE 250: Performed by: ANESTHESIOLOGY

## 2024-06-29 PROCEDURE — 700105 HCHG RX REV CODE 258: Performed by: ANESTHESIOLOGY

## 2024-06-29 PROCEDURE — 700102 HCHG RX REV CODE 250 W/ 637 OVERRIDE(OP): Performed by: ANESTHESIOLOGY

## 2024-06-29 PROCEDURE — 3E0T3BZ INTRODUCTION OF ANESTHETIC AGENT INTO PERIPHERAL NERVES AND PLEXI, PERCUTANEOUS APPROACH: ICD-10-PCS | Performed by: ANESTHESIOLOGY

## 2024-06-29 PROCEDURE — 502000 HCHG MISC OR IMPLANTS RC 0278: Performed by: STUDENT IN AN ORGANIZED HEALTH CARE EDUCATION/TRAINING PROGRAM

## 2024-06-29 PROCEDURE — 700111 HCHG RX REV CODE 636 W/ 250 OVERRIDE (IP): Mod: JZ | Performed by: STUDENT IN AN ORGANIZED HEALTH CARE EDUCATION/TRAINING PROGRAM

## 2024-06-29 PROCEDURE — 160041 HCHG SURGERY MINUTES - EA ADDL 1 MIN LEVEL 4: Performed by: STUDENT IN AN ORGANIZED HEALTH CARE EDUCATION/TRAINING PROGRAM

## 2024-06-29 PROCEDURE — 160048 HCHG OR STATISTICAL LEVEL 1-5: Performed by: STUDENT IN AN ORGANIZED HEALTH CARE EDUCATION/TRAINING PROGRAM

## 2024-06-29 DEVICE — LOCKING SCREW DIA 5X35MM: Type: IMPLANTABLE DEVICE | Site: LEG | Status: FUNCTIONAL

## 2024-06-29 DEVICE — IMPLANTABLE DEVICE: Type: IMPLANTABLE DEVICE | Site: LEG | Status: FUNCTIONAL

## 2024-06-29 RX ORDER — EPHEDRINE SULFATE 50 MG/ML
INJECTION, SOLUTION INTRAVENOUS PRN
Status: DISCONTINUED | OUTPATIENT
Start: 2024-06-29 | End: 2024-06-29 | Stop reason: SURG

## 2024-06-29 RX ORDER — BUPIVACAINE HYDROCHLORIDE 2.5 MG/ML
INJECTION, SOLUTION EPIDURAL; INFILTRATION; INTRACAUDAL PRN
Status: DISCONTINUED | OUTPATIENT
Start: 2024-06-29 | End: 2024-06-29 | Stop reason: SURG

## 2024-06-29 RX ORDER — OXYCODONE HCL 5 MG/5 ML
5 SOLUTION, ORAL ORAL
Status: COMPLETED | OUTPATIENT
Start: 2024-06-29 | End: 2024-06-29

## 2024-06-29 RX ORDER — LIDOCAINE HYDROCHLORIDE 20 MG/ML
INJECTION, SOLUTION EPIDURAL; INFILTRATION; INTRACAUDAL; PERINEURAL PRN
Status: DISCONTINUED | OUTPATIENT
Start: 2024-06-29 | End: 2024-06-29 | Stop reason: SURG

## 2024-06-29 RX ORDER — HYDRALAZINE HYDROCHLORIDE 20 MG/ML
5 INJECTION INTRAMUSCULAR; INTRAVENOUS
Status: DISCONTINUED | OUTPATIENT
Start: 2024-06-29 | End: 2024-06-29 | Stop reason: HOSPADM

## 2024-06-29 RX ORDER — CEFAZOLIN SODIUM 1 G/3ML
INJECTION, POWDER, FOR SOLUTION INTRAMUSCULAR; INTRAVENOUS PRN
Status: DISCONTINUED | OUTPATIENT
Start: 2024-06-29 | End: 2024-06-29 | Stop reason: SURG

## 2024-06-29 RX ORDER — SODIUM CHLORIDE, SODIUM LACTATE, POTASSIUM CHLORIDE, CALCIUM CHLORIDE 600; 310; 30; 20 MG/100ML; MG/100ML; MG/100ML; MG/100ML
INJECTION, SOLUTION INTRAVENOUS
Status: DISCONTINUED | OUTPATIENT
Start: 2024-06-29 | End: 2024-06-29 | Stop reason: SURG

## 2024-06-29 RX ORDER — ONDANSETRON 2 MG/ML
INJECTION INTRAMUSCULAR; INTRAVENOUS PRN
Status: DISCONTINUED | OUTPATIENT
Start: 2024-06-29 | End: 2024-06-29 | Stop reason: SURG

## 2024-06-29 RX ORDER — PHENYLEPHRINE HCL IN 0.9% NACL 1 MG/10 ML
SYRINGE (ML) INTRAVENOUS PRN
Status: DISCONTINUED | OUTPATIENT
Start: 2024-06-29 | End: 2024-06-29 | Stop reason: SURG

## 2024-06-29 RX ORDER — OXYCODONE HCL 5 MG/5 ML
10 SOLUTION, ORAL ORAL
Status: COMPLETED | OUTPATIENT
Start: 2024-06-29 | End: 2024-06-29

## 2024-06-29 RX ORDER — HALOPERIDOL 5 MG/ML
1 INJECTION INTRAMUSCULAR
Status: DISCONTINUED | OUTPATIENT
Start: 2024-06-29 | End: 2024-06-29 | Stop reason: HOSPADM

## 2024-06-29 RX ORDER — ONDANSETRON 2 MG/ML
4 INJECTION INTRAMUSCULAR; INTRAVENOUS
Status: DISCONTINUED | OUTPATIENT
Start: 2024-06-29 | End: 2024-06-29 | Stop reason: HOSPADM

## 2024-06-29 RX ORDER — DEXAMETHASONE SODIUM PHOSPHATE 4 MG/ML
INJECTION, SOLUTION INTRA-ARTICULAR; INTRALESIONAL; INTRAMUSCULAR; INTRAVENOUS; SOFT TISSUE PRN
Status: DISCONTINUED | OUTPATIENT
Start: 2024-06-29 | End: 2024-06-29 | Stop reason: SURG

## 2024-06-29 RX ORDER — SODIUM CHLORIDE, SODIUM LACTATE, POTASSIUM CHLORIDE, CALCIUM CHLORIDE 600; 310; 30; 20 MG/100ML; MG/100ML; MG/100ML; MG/100ML
INJECTION, SOLUTION INTRAVENOUS CONTINUOUS
Status: DISCONTINUED | OUTPATIENT
Start: 2024-06-29 | End: 2024-06-29 | Stop reason: HOSPADM

## 2024-06-29 RX ADMIN — DEXAMETHASONE SODIUM PHOSPHATE 2 MG: 4 INJECTION INTRA-ARTICULAR; INTRALESIONAL; INTRAMUSCULAR; INTRAVENOUS; SOFT TISSUE at 09:10

## 2024-06-29 RX ADMIN — ACETAMINOPHEN 1000 MG: 500 TABLET ORAL at 21:16

## 2024-06-29 RX ADMIN — FENTANYL CITRATE 25 MCG: 50 INJECTION, SOLUTION INTRAMUSCULAR; INTRAVENOUS at 10:28

## 2024-06-29 RX ADMIN — DEXAMETHASONE SODIUM PHOSPHATE 4 MG: 4 INJECTION INTRA-ARTICULAR; INTRALESIONAL; INTRAMUSCULAR; INTRAVENOUS; SOFT TISSUE at 09:14

## 2024-06-29 RX ADMIN — SODIUM CHLORIDE, POTASSIUM CHLORIDE, SODIUM LACTATE AND CALCIUM CHLORIDE: 600; 310; 30; 20 INJECTION, SOLUTION INTRAVENOUS at 09:03

## 2024-06-29 RX ADMIN — BUPIVACAINE HYDROCHLORIDE 30 ML: 2.5 INJECTION, SOLUTION EPIDURAL; INFILTRATION; INTRACAUDAL at 09:10

## 2024-06-29 RX ADMIN — ANTACID TABLETS 500 MG: 500 TABLET, CHEWABLE ORAL at 05:20

## 2024-06-29 RX ADMIN — THERA TABS 1 TABLET: TAB at 05:20

## 2024-06-29 RX ADMIN — FENTANYL CITRATE 25 MCG: 50 INJECTION, SOLUTION INTRAMUSCULAR; INTRAVENOUS at 10:15

## 2024-06-29 RX ADMIN — CEFAZOLIN 2 G: 1 INJECTION, POWDER, FOR SOLUTION INTRAMUSCULAR; INTRAVENOUS at 09:03

## 2024-06-29 RX ADMIN — Medication 100 MCG: at 09:17

## 2024-06-29 RX ADMIN — LEVOTHYROXINE SODIUM 88 MCG: 0.09 TABLET ORAL at 05:20

## 2024-06-29 RX ADMIN — ACETAMINOPHEN 1000 MG: 500 TABLET ORAL at 05:19

## 2024-06-29 RX ADMIN — Medication 100 MCG: at 09:07

## 2024-06-29 RX ADMIN — Medication 100 MCG: at 09:14

## 2024-06-29 RX ADMIN — LIDOCAINE HYDROCHLORIDE 60 MG: 20 INJECTION, SOLUTION EPIDURAL; INFILTRATION; INTRACAUDAL at 09:06

## 2024-06-29 RX ADMIN — CHOLECALCIFEROL TAB 125 MCG (5000 UNIT) 5000 UNITS: 125 TAB at 05:20

## 2024-06-29 RX ADMIN — OXYCODONE HYDROCHLORIDE 10 MG: 5 SOLUTION ORAL at 10:01

## 2024-06-29 RX ADMIN — EPHEDRINE SULFATE 10 MG: 50 INJECTION, SOLUTION INTRAVENOUS at 09:21

## 2024-06-29 RX ADMIN — Medication 200 MCG: at 09:20

## 2024-06-29 RX ADMIN — EPHEDRINE SULFATE 10 MG: 50 INJECTION, SOLUTION INTRAVENOUS at 09:32

## 2024-06-29 RX ADMIN — CELECOXIB 200 MG: 200 CAPSULE ORAL at 05:20

## 2024-06-29 RX ADMIN — FENTANYL CITRATE 100 MCG: 50 INJECTION, SOLUTION INTRAMUSCULAR; INTRAVENOUS at 09:06

## 2024-06-29 RX ADMIN — FENTANYL CITRATE 50 MCG: 50 INJECTION, SOLUTION INTRAMUSCULAR; INTRAVENOUS at 10:01

## 2024-06-29 RX ADMIN — ENOXAPARIN SODIUM 40 MG: 100 INJECTION SUBCUTANEOUS at 21:16

## 2024-06-29 RX ADMIN — ROSUVASTATIN CALCIUM 20 MG: 20 TABLET, FILM COATED ORAL at 16:43

## 2024-06-29 RX ADMIN — TRAZODONE HYDROCHLORIDE 50 MG: 50 TABLET ORAL at 21:17

## 2024-06-29 RX ADMIN — Medication 400 MG: at 05:20

## 2024-06-29 RX ADMIN — PROPOFOL 80 MG: 10 INJECTION, EMULSION INTRAVENOUS at 09:06

## 2024-06-29 RX ADMIN — ONDANSETRON 4 MG: 2 INJECTION INTRAMUSCULAR; INTRAVENOUS at 09:42

## 2024-06-29 RX ADMIN — ACETAMINOPHEN 1000 MG: 500 TABLET ORAL at 15:05

## 2024-06-29 ASSESSMENT — PAIN DESCRIPTION - PAIN TYPE
TYPE: SURGICAL PAIN
TYPE: ACUTE PAIN;SURGICAL PAIN
TYPE: ACUTE PAIN

## 2024-06-29 ASSESSMENT — ENCOUNTER SYMPTOMS
HEMOPTYSIS: 0
EYE PAIN: 0
CONSTIPATION: 0
NECK PAIN: 0
ABDOMINAL PAIN: 0
FOCAL WEAKNESS: 0
BRUISES/BLEEDS EASILY: 0
COUGH: 0
LOSS OF CONSCIOUSNESS: 0
DEPRESSION: 0
VOMITING: 0
HEADACHES: 0
FEVER: 0
SORE THROAT: 0
BACK PAIN: 0
DIAPHORESIS: 0
MYALGIAS: 0
SPEECH CHANGE: 0
SENSORY CHANGE: 0
WEAKNESS: 0
EYE DISCHARGE: 0
WHEEZING: 0
PALPITATIONS: 0
SHORTNESS OF BREATH: 0
SPUTUM PRODUCTION: 0
DIARRHEA: 0
CLAUDICATION: 0
NAUSEA: 0
DIZZINESS: 0
CHILLS: 0

## 2024-06-29 ASSESSMENT — PATIENT HEALTH QUESTIONNAIRE - PHQ9
SUM OF ALL RESPONSES TO PHQ9 QUESTIONS 1 AND 2: 0
1. LITTLE INTEREST OR PLEASURE IN DOING THINGS: NOT AT ALL

## 2024-06-29 ASSESSMENT — LIFESTYLE VARIABLES: SUBSTANCE_ABUSE: 0

## 2024-06-29 ASSESSMENT — PAIN SCALES - GENERAL: PAIN_LEVEL: 3

## 2024-06-29 NOTE — THERAPY
Occupational Therapy Contact Note    Patient Name: Tayla Molina  Age:  84 y.o., Sex:  female  Medical Record #: [id number]  Today's Date: 6/29/2024    OT orders received, pt scheduled for surgery today. Will complete OT eval when pt is appropriate.

## 2024-06-29 NOTE — ANESTHESIA PROCEDURE NOTES
Peripheral Block    Date/Time: 6/29/2024 9:08 AM    Performed by: Tanner Carias M.D.  Authorized by: Tanner Carias M.D.    Patient Location:  OR  Start Time:  6/29/2024 9:08 AM  End Time:  6/29/2024 9:12 AM  Reason for Block: at surgeon's request and post-op pain management ONLY    patient identified, IV checked, site marked, risks and benefits discussed, surgical consent, monitors and equipment checked, pre-op evaluation and timeout performed    Patient Position:  Supine  Prep: ChloraPrep    Monitoring:  Heart rate, continuous pulse ox and cardiac monitor  Block Region:  Lower Extremity  Lower Extremity - Block Type:  FEMORAL nerve block, Supra-Inguinal Fascia Iliaca approach    Laterality:  Right  Procedures: ultrasound guided  Image captured, interpreted and electronically stored.  Local Infiltration:  Lidocaine  Strength:  1 %  Dose:  3 ml  Block Type:  Single-shot  Needle Length:  100mm  Needle Gauge:  21 G  Needle Localization:  Ultrasound guidance  Ultrasound picture in chart  Injection Assessment:  Negative aspiration for heme, incremental injection and local visualized surrounding nerve on ultrasound  Evidence of intravascular injection: No     Suprainguinal Fascia Iliaca Block   With the patient supine, the US transducer was placed perpendicular to the ASIS of the operative side. The ASIS was identified at a point where the internal oblique, transversus abdominis and psoas major are stacked medial to the iliacus muscle. The plane in between was the fascia iliaca. A nerve block needle was advanced into the fascia iliaca and local anesthetic was injected deep/lateral to the fascia iliaca, just above the iliacus muscle.    Block performed after inducton of general anesthesia

## 2024-06-29 NOTE — ANESTHESIA TIME REPORT
Anesthesia Start and Stop Event Times       Date Time Event    6/29/2024 0815 Ready for Procedure     0903 Anesthesia Start     0955 Anesthesia Stop          Responsible Staff  06/29/24      Name Role Begin End    Tanner Carias M.D. Anesth 0903 [id number]          Overtime Reason:  no overtime (within assigned shift)    Comments:

## 2024-06-29 NOTE — ANESTHESIA PROCEDURE NOTES
Airway    Date/Time: 6/29/2024 9:06 AM    Performed by: Tanner Carias M.D.  Authorized by: Tanner Carias M.D.    Location:  OR  Urgency:  Elective  Indications for Airway Management:  Anesthesia      Spontaneous Ventilation: absent    Sedation Level:  Deep  Preoxygenated: Yes    Final Airway Type:  Supraglottic airway  Final Supraglottic Airway:  Standard LMA    SGA Size:  3  Number of Attempts at Approach:  1   Atraumatic insertion, good seal

## 2024-06-29 NOTE — ANESTHESIA PREPROCEDURE EVALUATION
Case: [id number] Date/Time: 06/29/24 1051    Procedure: INSERTION, INTRAMEDULLARY NIKA, FEMUR, PROXIMAL (Right)    Location: [facility] OR 11 / SURGERY [facility]    Surgeons: Oren Velasco M.D.          Hx of CVA  Denies: MI/CHF/smoking/DM/CKD    Relevant Problems   NEURO   (positive) Migraine with aura and without status migrainosus, not intractable      CARDIAC   (positive) Migraine with aura and without status migrainosus, not intractable      ENDO   (positive) Hypothyroidism due to acquired atrophy of thyroid      Other   (positive) Rheumatoid arthritis involving multiple sites with positive rheumatoid factor (HCC)       Physical Exam    Airway   Mallampati: II  TM distance: >3 FB  Neck ROM: full       Cardiovascular - normal exam  Rhythm: regular  Rate: normal  (-) murmur     Dental - normal exam           Pulmonary - normal exam  Breath sounds clear to auscultation     Abdominal    Neurological - normal exam                   Anesthesia Plan    ASA 3   ASA physical status 3 criteria: CVA or TIA - history (> 3 months)    Plan - general       Airway plan will be LMA          Induction: intravenous    Postoperative Plan: Postoperative administration of opioids is intended.    Pertinent diagnostic labs and testing reviewed    Informed Consent:    Anesthetic plan and risks discussed with patient.    Use of blood products discussed with: patient whom consented to blood products.

## 2024-06-29 NOTE — CARE PLAN
The patient is Watcher - Medium risk of patient condition declining or worsening    Shift Goals  Clinical Goals: safety ,pain control  Patient Goals: rest, comfort  Family Goals: no famnily present    Progress made toward(s) clinical / shift goals:    Problem: Pain - Standard  Goal: Alleviation of pain or a reduction in pain to the patient’s comfort goal  Outcome: Progressing    Administer pain medication per MAR.     Problem: Knowledge Deficit - Standard  Goal: Patient and family/care givers will demonstrate understanding of plan of care, disease process/condition, diagnostic tests and medications  Outcome: Progressing    Educate the patient regarding her plan of care.        Patient is not progressing towards the following goals:

## 2024-06-29 NOTE — OR NURSING
Report taken from usha dailey patient is scheduled 10:53 am today for INSERTION, INTRAMEDULLARY NIKA, FEMUR, PROXIMAL - Right. Patient needs CHG .

## 2024-06-29 NOTE — CARE PLAN
Problem: Pain - Standard  Goal: Alleviation of pain or a reduction in pain to the patient’s comfort goal  Outcome: Progressing  Note: Prn meds per MAR     Problem: Knowledge Deficit - Standard  Goal: Patient and family/care givers will demonstrate understanding of plan of care, disease process/condition, diagnostic tests and medications  Outcome: Progressing  Note: Pre op   The patient is Stable - Low risk of patient condition declining or worsening    Shift Goals  Clinical Goals: safety ,pain control  Patient Goals: rest, comfort  Family Goals: no famnily present    Progress made toward(s) clinical / shift goals:  preop, pain control    Patient is not progressing towards the following goals:

## 2024-06-29 NOTE — OP REPORT
DATE OF OPERATION: 6/29/2024     PREOPERATIVE DIAGNOSIS: Right intertrochanteric femur fracture    POSTOPERATIVE DIAGNOSIS: Same    PROCEDURE PERFORMED: Right femur cephalomedullary nail    SURGEON: Oren Velasco M.D.     ASSISTANT: None    ANESTHESIA: General    SPECIMEN: None    ESTIMATED BLOOD LOSS: 50 mL    IMPLANTS: Cezar short gamma nail, 95 mm lag screw, 35 mm distal interlock screw      INDICATIONS: The patient is a 84 y.o. female who presented with above.  I discussed the risks and benefits of the procedure which include but are not limited to risks of infection, wound healing complication, neurovascular injury, pain, malunion, non-union, malrotation, and the medical risks of anesthesia including MI, stroke, and death.  Alternatives to surgery were also discussed, including non-operative management, which I did not recommend.  The patient was in agreement with the plan to proceed, and the informed consent was signed and documented.  I met with the patient pre-operatively and marked the operative extremity with their agreement.  We proceeded to the operating room.     DESCRIPTION OF PROCEDURE:  Patient was seen in the preoperative holding area on the day of surgery. The operative site was marked with my initials.  she was taken to the operating room and placed supine on the operative table.  Anesthesia was induced.  The operative extremity was prepped and draped in the normal sterile fashion.  Operative pause was conducted and the correct patient, site, side, procedure, and surgeon's initials on extremity were identified.  Combination axial traction internal rotation and adduction were used to reduce the fracture.  There is still a small gap medial calcar.  We tried to adjust rotation but still had a gap.  We placed our starting guidewire in appropriate position and then used into reamer again anterior canal.  We then placed our implant into position.  We then used a percutaneously placed a bone hook  on the medial calcar of the femoral neck to assist with reduction back to the shaft we then placed a anterior position K wire holding provisionally.  Next we placed our guidewire for a lag screw this was checked on AP and lateral views.  This was measured overdrilled and placed on hand to rule out applied some compression through the jig AP and lateral views were again checked at this time the distal screw was drilled placed through the jig.  Jig was removed final images were obtained again reasonable reduction implant position.  Wounds were irrigated sterile saline closed in layered fashion.  Sterile dressings applied.  Patient was awoken taken to PACU stable condition.    POSTOPERATIVE PLAN: Weightbearing as tolerated right lower extremity weight bearing.  Mobilize with physical and occupational therapies.  DVT prophylaxis with SCDs and Lovenox until mobilizing independently and then can be switched to aspirin for 4 weeks.  The patient will follow up in clinic in 2 weeks to check wounds and remove sutures/staples.      ____________________________________   Oren Velasco M.D.   DD: 6/29/2024  9:59 AM

## 2024-06-29 NOTE — THERAPY
Physical Therapy Contact Note    Patient Name: Tayla Molina  Age:  84 y.o., Sex:  female  Medical Record #: [id number]  Today's Date: 6/29/2024    PT orders received. Pt scheduled today for surgery. PT to check back as able.

## 2024-06-29 NOTE — CONSULTS
6/29/2024          HPI: Tayla Molina is a 84 y.o. female who presents with right hip pain after ground-level fall.  She is brought emergency department where x-rays revealed displaced right intertrochanteric femur fracture.  No other injuries identified.    Past Medical History:   Diagnosis Date    Arthritis     rheumatoid- dr allen    Hypothyroidism     Osteoporosis     Pneumonia 2000    Psychiatric problem     Depression    Sleep apnea     Resolved after bariatric surgery    Snoring     Stroke (HCC)        Past Surgical History:   Procedure Laterality Date    PB TOTAL KNEE ARTHROPLASTY Left 3/4/2021    Procedure: ARTHROPLASTY, KNEE, TOTAL;  Surgeon: Dakota Hong M.D.;  Location: SURGERY [facility];  Service: Orthopedics    LAMINOTOMY  2012    lumbar L5    GASTRIC BYPASS LAPAROSCOPIC N/A 2004    ABDOMINAL EXPLORATION  2004    gastric bypass    LISSY BY LAPAROSCOPY  2002    MASTECTOMY Bilateral 1974    in [place]; no cancer (multiple benign tumor)    VAGINAL HYSTERECTOMY TOTAL  1972    without oophorectoomies    DENTAL EXTRACTION(S)      PRIMARY C SECTION         Medications  No current facility-administered medications on file prior to encounter.     Current Outpatient Medications on File Prior to Encounter   Medication Sig Dispense Refill    celecoxib (CELEBREX) 200 MG Cap Take 200 mg by mouth every day.      rosuvastatin (CRESTOR) 20 MG Tab Take 20 mg by mouth every day.      levothyroxine (SYNTHROID) 88 MCG Tab Take 88 mcg by mouth every morning on an empty stomach.      MAGNESIUM OXIDE PO Take 1 Tablet by mouth every day.      TURMERIC PO Take 1 Capsule by mouth 2 times a day.      CALCIUM PO Take 1 Tablet by mouth every day.      albuterol 108 (90 Base) MCG/ACT Aero Soln inhalation aerosol Inhale 2 Puffs every 6 hours as needed for Shortness of Breath.      acetaminophen (TYLENOL) 500 MG Tab Take 1,000 mg by mouth every 6 hours as needed for Mild Pain. 2 tablets=1000mg      XELJANZ XR 11  "MG TABLET SR 24 HR Take 11 mg by mouth every day.      Riboflavin (VITAMIN B-2) 100 MG Tab Take 400 mg by mouth every day.      Multiple Vitamin (MULTIVITAMIN PO) Take 1 Tablet by mouth every day.      traZODone (DESYREL) 50 MG Tab Take 1 Tab by mouth every bedtime. 90 Tab 4    alendronate (FOSAMAX) 70 MG Tab Take 70 mg by mouth every 7 days.         Allergies  Kiwi extract, Pineapple, Codeine, and Lyrica    ROS  . All other systems were reviewed and found to be negative    Family History   Problem Relation Age of Onset    Hypertension Father        Social History     Socioeconomic History    Marital status:    Tobacco Use    Smoking status: Former     Current packs/day: 0.00     Types: Cigarettes     Start date:      Quit date: 1987     Years since quittin.9    Smokeless tobacco: Never   Vaping Use    Vaping status: Never Used   Substance and Sexual Activity    Alcohol use: Not Currently    Drug use: Never    Sexual activity: Not Currently       Physical Exam  Vitals  /60   Pulse 68   Temp 35.8 °C (96.5 °F) (Temporal)   Resp 16   Ht 1.549 m (5' 1\")   Wt 66.7 kg (147 lb)   SpO2 99%   General: Well Developed, Well Nourished, Age appropriate appearance  HEENT: Normocephalic, atraumatic  Psych: Normal mood and affect  Neck: Supple, nontender, no masses  Lungs: Breathing unlabored, No audible wheezing  Heart: Regular heart rate and rhythm  Abdomen: Soft, NT, ND  Neuro: Sensation grossly intact to BUE and BLE, moving all four extremities  Skin: Intact, no open wounds  Vascular: , Capillary refill <2 seconds  MSK: Right lower extremity: Mild mild shortening noted.  Pain with logroll.  Sensation intact distally.      Radiographs:  DX-CHEST-LIMITED (1 VIEW)   Final Result      No acute cardiac or pulmonary abnormalities are identified.      DX-FEMUR-2+ RIGHT   Final Result      Acute three-part right hip intertrochanteric fracture.      DX-PELVIS-1 OR 2 VIEWS   Final Result      Acute " comminuted 3 part right-sided intertrochanteric hip fracture.      DX-PORTABLE FLUOROSCOPY < 1 HOUR    (Results Pending)   DX-HIP-UNILATERAL-W/O PELVIS-2/3 VIEWS RIGHT    (Results Pending)       Laboratory Values  Recent Labs     06/28/24  1545   WBC 6.0   RBC 3.18*   HEMOGLOBIN 10.8*   HEMATOCRIT 31.8*   .0*   MCH 34.0*   MCHC 34.0   RDW 43.4   PLATELETCT 224   MPV 10.3     Recent Labs     06/28/24  1545   SODIUM 140   POTASSIUM 4.2   CHLORIDE 107   CO2 23   GLUCOSE 103*   BUN 25*     Recent Labs     06/28/24  1545   APTT 27.6   INR 1.03         Impression: 84-year-old female ground-level fall with right intertrochanteric femur fracture.  Plan for short cephalomedullary nail placement today.    Plan:We discussed the diagnosis and findings with the patient at length.  We reviewed possible non operative and operative interventions and the risks and benefits of each of these.  she had a chance to ask questions and all of these were answered to her satisfaction. The patient chose to proceed with surgical intervention. Risks and benefits of surgery were discussed which include but are not limited to bleeding, infection, neurovascular damage, malunion, nonunion, instability, limb length discrepancy, DVT, PE, MI, Stroke and death. They understand these risks and wish to proceed.      Oren Velasco MD  Orthopedic Trauma Surgery

## 2024-06-29 NOTE — PROGRESS NOTES
Back from pacu, Cornelius ruggiero while in bed, right hip dressing cdi with ice pack on site,daughter made aware via phone, POC discussed, needs attended.

## 2024-06-29 NOTE — PROGRESS NOTES
Received in bed, aaox4, NPO for planned surgery, to preop via bed, preop RN aware of pt going to preop with special bra needed to be worn sp silicone removal last week.

## 2024-06-29 NOTE — OR NURSING
Pt is aaox4, resting comfortably in hospital bed on 2lpm nasal cannula. Her respirations are equal and unlabored, she is in no acute distress. She is treated for pain per MAR with good response. She denies n/v and takes sips of water without difficulty. 1+ pedal pulse noted to RLE and is cool to touch, pt's RLE cap refill < 3 seconds. Surgical site clean, dry and intact with ice pack in place. Pt provided blankets for warmth. Will continue to monitor.

## 2024-06-29 NOTE — PROGRESS NOTES
4 Eyes Skin Assessment Completed by Iker RN and Kimberley RN.    Head WDL  Ears WDL  Nose WDL  Mouth WDL  Neck WDL  Breast/Chest, surgical incision under bilateral breasts, bruising to mid chest  Shoulder Blades WDL  Spine WDL  (R) Arm/Elbow/Hand WDL  (L) Arm/Elbow/Hand WDL  Abdomen WDL  Groin WDL  Scrotum/Coccyx/Buttocks WDL  (R) Leg WDL  (L) Leg WDL  (R) Heel/Foot/Toe WDL  (L) Heel/Foot/Toe WDL          Devices In Places Blood Pressure Cuff and Pulse Ox      Interventions In Place Pillows    Possible Skin Injury No    Pictures Uploaded Into Epic N/A  Wound Consult Placed N/A  RN Wound Prevention Protocol Ordered No

## 2024-06-29 NOTE — ANESTHESIA POSTPROCEDURE EVALUATION
Patient: Tayla Molina    Procedure Summary       Date: 06/29/24 Room / Location: [place] [address] / SURGERY [facility]    Anesthesia Start: 0903 Anesthesia Stop: 0955    Procedure: INSERTION, INTRAMEDULLARY NIKA, FEMUR, PROXIMAL (Right: Leg Upper) Diagnosis: (right intertrochanteric femur fracture)    Surgeons: Oren Velasco M.D. Responsible Provider: Tanner Carias M.D.    Anesthesia Type: general ASA Status: 3            Final Anesthesia Type: general  Last vitals  BP   Blood Pressure : 106/49    Temp   36.1 °C (97 °F)    Pulse   81   Resp   15    SpO2   97 %      Anesthesia Post Evaluation    Patient location during evaluation: PACU  Patient participation: complete - patient participated  Level of consciousness: awake and alert  Pain score: 3    Airway patency: patent  Anesthetic complications: no  Cardiovascular status: hemodynamically stable  Respiratory status: acceptable  Hydration status: euvolemic    PONV: none          No notable events documented.     Nurse Pain Score: 3 (NPRS)

## 2024-06-30 VITALS
BODY MASS INDEX: 27.75 KG/M2 | RESPIRATION RATE: 18 BRPM | DIASTOLIC BLOOD PRESSURE: 72 MMHG | HEART RATE: 96 BPM | WEIGHT: 147 LBS | OXYGEN SATURATION: 96 % | SYSTOLIC BLOOD PRESSURE: 119 MMHG | HEIGHT: 61 IN | TEMPERATURE: 99 F

## 2024-06-30 LAB
ANION GAP SERPL CALC-SCNC: 8 MMOL/L (ref 7–16)
BASOPHILS # BLD AUTO: 0.3 % (ref 0–1.8)
BASOPHILS # BLD: 0.03 K/UL (ref 0–0.12)
BUN SERPL-MCNC: 24 MG/DL (ref 8–22)
CALCIUM SERPL-MCNC: 8.5 MG/DL (ref 8.5–10.5)
CHLORIDE SERPL-SCNC: 105 MMOL/L (ref 96–112)
CO2 SERPL-SCNC: 25 MMOL/L (ref 20–33)
CREAT SERPL-MCNC: 0.72 MG/DL (ref 0.5–1.4)
EOSINOPHIL # BLD AUTO: 0.01 K/UL (ref 0–0.51)
EOSINOPHIL NFR BLD: 0.1 % (ref 0–6.9)
ERYTHROCYTE [DISTWIDTH] IN BLOOD BY AUTOMATED COUNT: 44.7 FL (ref 35.9–50)
GFR SERPLBLD CREATININE-BSD FMLA CKD-EPI: 83 ML/MIN/1.73 M 2
GLUCOSE SERPL-MCNC: 149 MG/DL (ref 65–99)
HCT VFR BLD AUTO: 26.3 % (ref 37–47)
HGB BLD-MCNC: 8.7 G/DL (ref 12–16)
IMM GRANULOCYTES # BLD AUTO: 0.06 K/UL (ref 0–0.11)
IMM GRANULOCYTES NFR BLD AUTO: 0.5 % (ref 0–0.9)
LYMPHOCYTES # BLD AUTO: 1 K/UL (ref 1–4.8)
LYMPHOCYTES NFR BLD: 8.8 % (ref 22–41)
MCH RBC QN AUTO: 33.7 PG (ref 27–33)
MCHC RBC AUTO-ENTMCNC: 33.1 G/DL (ref 32.2–35.5)
MCV RBC AUTO: 101.9 FL (ref 81.4–97.8)
MONOCYTES # BLD AUTO: 0.72 K/UL (ref 0–0.85)
MONOCYTES NFR BLD AUTO: 6.3 % (ref 0–13.4)
NEUTROPHILS # BLD AUTO: 9.58 K/UL (ref 1.82–7.42)
NEUTROPHILS NFR BLD: 84 % (ref 44–72)
NRBC # BLD AUTO: 0 K/UL
NRBC BLD-RTO: 0 /100 WBC (ref 0–0.2)
PLATELET # BLD AUTO: 173 K/UL (ref 164–446)
PMV BLD AUTO: 10 FL (ref 9–12.9)
POTASSIUM SERPL-SCNC: 4.2 MMOL/L (ref 3.6–5.5)
RBC # BLD AUTO: 2.58 M/UL (ref 4.2–5.4)
SODIUM SERPL-SCNC: 138 MMOL/L (ref 135–145)
WBC # BLD AUTO: 11.4 K/UL (ref 4.8–10.8)

## 2024-06-30 PROCEDURE — A9270 NON-COVERED ITEM OR SERVICE: HCPCS | Performed by: STUDENT IN AN ORGANIZED HEALTH CARE EDUCATION/TRAINING PROGRAM

## 2024-06-30 PROCEDURE — 700102 HCHG RX REV CODE 250 W/ 637 OVERRIDE(OP): Performed by: STUDENT IN AN ORGANIZED HEALTH CARE EDUCATION/TRAINING PROGRAM

## 2024-06-30 PROCEDURE — 99232 SBSQ HOSP IP/OBS MODERATE 35: CPT | Performed by: HOSPITALIST

## 2024-06-30 PROCEDURE — 700111 HCHG RX REV CODE 636 W/ 250 OVERRIDE (IP): Mod: JZ | Performed by: STUDENT IN AN ORGANIZED HEALTH CARE EDUCATION/TRAINING PROGRAM

## 2024-06-30 PROCEDURE — 97535 SELF CARE MNGMENT TRAINING: CPT

## 2024-06-30 PROCEDURE — 97162 PT EVAL MOD COMPLEX 30 MIN: CPT

## 2024-06-30 PROCEDURE — 97165 OT EVAL LOW COMPLEX 30 MIN: CPT

## 2024-06-30 PROCEDURE — 85025 COMPLETE CBC W/AUTO DIFF WBC: CPT

## 2024-06-30 PROCEDURE — 97116 GAIT TRAINING THERAPY: CPT

## 2024-06-30 PROCEDURE — 770006 HCHG ROOM/CARE - MED/SURG/GYN SEMI*

## 2024-06-30 PROCEDURE — 80048 BASIC METABOLIC PNL TOTAL CA: CPT

## 2024-06-30 RX ADMIN — THERA TABS 1 TABLET: TAB at 04:31

## 2024-06-30 RX ADMIN — ACETAMINOPHEN 1000 MG: 500 TABLET ORAL at 16:39

## 2024-06-30 RX ADMIN — ACETAMINOPHEN 1000 MG: 500 TABLET ORAL at 00:40

## 2024-06-30 RX ADMIN — LEVOTHYROXINE SODIUM 88 MCG: 0.09 TABLET ORAL at 04:31

## 2024-06-30 RX ADMIN — CELECOXIB 200 MG: 200 CAPSULE ORAL at 04:31

## 2024-06-30 RX ADMIN — ACETAMINOPHEN 1000 MG: 500 TABLET ORAL at 04:31

## 2024-06-30 RX ADMIN — ANTACID TABLETS 500 MG: 500 TABLET, CHEWABLE ORAL at 04:31

## 2024-06-30 RX ADMIN — CHOLECALCIFEROL TAB 125 MCG (5000 UNIT) 5000 UNITS: 125 TAB at 04:31

## 2024-06-30 RX ADMIN — TRAZODONE HYDROCHLORIDE 50 MG: 50 TABLET ORAL at 21:45

## 2024-06-30 RX ADMIN — Medication 400 MG: at 04:31

## 2024-06-30 RX ADMIN — ROSUVASTATIN CALCIUM 20 MG: 20 TABLET, FILM COATED ORAL at 16:39

## 2024-06-30 RX ADMIN — ENOXAPARIN SODIUM 40 MG: 100 INJECTION SUBCUTANEOUS at 16:39

## 2024-06-30 ASSESSMENT — GAIT ASSESSMENTS
DEVIATION: ANTALGIC;BRADYKINETIC
DISTANCE (FEET): 60
ASSISTIVE DEVICE: FRONT WHEEL WALKER
GAIT LEVEL OF ASSIST: SUPERVISED

## 2024-06-30 ASSESSMENT — ENCOUNTER SYMPTOMS
VOMITING: 0
PALPITATIONS: 0
SHORTNESS OF BREATH: 0
HEADACHES: 0
FOCAL WEAKNESS: 0
WHEEZING: 0
SPUTUM PRODUCTION: 0
EYE PAIN: 0
HEMOPTYSIS: 0
COUGH: 0
DIZZINESS: 0
EYE DISCHARGE: 0
NAUSEA: 0
WEAKNESS: 0
NECK PAIN: 0
BACK PAIN: 0
SENSORY CHANGE: 0
DIAPHORESIS: 0
BRUISES/BLEEDS EASILY: 0
FEVER: 0
CONSTIPATION: 0
DIARRHEA: 0
MYALGIAS: 0
CLAUDICATION: 0
DEPRESSION: 0
LOSS OF CONSCIOUSNESS: 0
ABDOMINAL PAIN: 0
SPEECH CHANGE: 0
CHILLS: 0
SORE THROAT: 0

## 2024-06-30 ASSESSMENT — COGNITIVE AND FUNCTIONAL STATUS - GENERAL
DAILY ACTIVITIY SCORE: 22
SUGGESTED CMS G CODE MODIFIER DAILY ACTIVITY: CJ
TURNING FROM BACK TO SIDE WHILE IN FLAT BAD: A LITTLE
MOVING TO AND FROM BED TO CHAIR: A LITTLE
MOBILITY SCORE: 18
WALKING IN HOSPITAL ROOM: A LITTLE
HELP NEEDED FOR BATHING: A LITTLE
CLIMB 3 TO 5 STEPS WITH RAILING: A LITTLE
SUGGESTED CMS G CODE MODIFIER MOBILITY: CK
MOVING FROM LYING ON BACK TO SITTING ON SIDE OF FLAT BED: A LITTLE
STANDING UP FROM CHAIR USING ARMS: A LITTLE
DRESSING REGULAR LOWER BODY CLOTHING: A LITTLE

## 2024-06-30 ASSESSMENT — ACTIVITIES OF DAILY LIVING (ADL): TOILETING: INDEPENDENT

## 2024-06-30 ASSESSMENT — PAIN DESCRIPTION - PAIN TYPE
TYPE: ACUTE PAIN;SURGICAL PAIN

## 2024-06-30 ASSESSMENT — LIFESTYLE VARIABLES: SUBSTANCE_ABUSE: 0

## 2024-06-30 NOTE — CARE PLAN
Problem: Pain - Standard  Goal: Alleviation of pain or a reduction in pain to the patient’s comfort goal  Outcome: Progressing     Problem: Knowledge Deficit - Standard  Goal: Patient and family/care givers will demonstrate understanding of plan of care, disease process/condition, diagnostic tests and medications  Outcome: Progressing     Problem: Skin Integrity  Goal: Skin integrity is maintained or improved  Outcome: Progressing     Problem: Fall Risk  Goal: Patient will remain free from falls  Outcome: Progressing    The patient is Stable - Low risk of patient condition declining or worsening    Shift Goals  Clinical Goals: Pain management, safety  Patient Goals: Pain control, comfort  Family Goals: Not present    Progress made toward(s) clinical / shift goals: Reduction of pain. Verbalized understanding towards the side effects of the medication provided. No new skin tear/ breakdown of the skin found. Placed bed in a lowest possible position, placed bed side table and call bell within reach, side rails up x2, kept safe and comfortably.

## 2024-06-30 NOTE — THERAPY
Occupational Therapy   Initial Evaluation     Patient Name: Tayla Molina  Age:  84 y.o., Sex:  female  Medical Record #: [id number]  Today's Date: 6/30/2024     Precautions: Fall Risk, Weight Bearing As Tolerated Right Lower Extremity    Assessment  Patient is 84 y.o. female admitted after a GLF where she sustained a right intertrochanteric femur fx. Pt seen s/p right femur cephalomedullary nail. PMHx of migraine, hypothyroidism, and RA. Pt seen for OT eval and tx. Pt currently resides with her daughter in a 1-story house and was independent with ADLs and IADLs.    During OT eval, pt demo ability to complete ADLs, functional mobility, and txfs using FWW with supv-SBA using FWW. Pt was provided training on use of AE to manage socks and underwear; pt able to return demonstration with supv. Discussed with pt AE/DME available to assist with improving safety with toilet txfs and discussed safety concerns regarding use of suction cup grab bars; pt receptive to education. Pt provided additional education on WB precautions, energy conservation, home safety, compensatory strategies to safely complete ADLs, and importance of OOB ADLs to reduce risk of deconditioning. Pt has no further acute OT needs, but recommend home health for further OT services after DC.     Patient will not be actively followed for occupational therapy services at this time, however may be seen if requested by physician for 1 more visit within 30 days to address any discharge or equipment needs.     Plan    Occupational Therapy Initial Treatment Plan   Duration: Discharge Needs Only    DC Equipment Recommendations: Grab Bar(s) by Toilet, Sock Aide  Discharge Recommendations: Recommend home health for continued occupational therapy services      Objective      Prior Living Situation   Prior Services Home-Independent   Housing / Facility 1 Story House   Steps Into Home 3   Steps In Home 0   Bathroom Set up Walk In Shower;Shower Chair   Equipment  Owned Front-Wheel Walker;Tub / Shower Seat;Hand Held Shower;Reacher;Other (Comments)  (Recliner)   Lives with - Patient's Self Care Capacity Adult Children   Comments Pt currently resides with her daughter who works outside of the house. Can provide assist when home.   Prior Level of ADL Function   Self Feeding Independent   Grooming / Hygiene Independent   Bathing Independent   Dressing Independent   Toileting Independent   Prior Level of IADL Function   Medication Management Independent   Laundry Independent   Kitchen Mobility Independent   Finances Independent   Home Management Independent   Shopping Independent   Prior Level Of Mobility Independent Without Device in Community;Independent Without Device in Home   Driving / Transportation Driving Independent   Occupation (Pre-Hospital Vocational) Retired Due To Age   History of Falls   History of Falls Yes   Date of Last Fall   (Related to admit)   Precautions   Precautions Fall Risk;Weight Bearing As Tolerated Right Lower Extremity   Vitals   O2 Delivery Device None - Room Air   Pain 0 - 10 Group   Therapist Pain Assessment Post Activity Pain Same as Prior to Activity;Nurse Notified  (Not rated, reported a mild increase in RLE pain with activity)   Cognition    Cognition / Consciousness WDL   Level of Consciousness Alert   Comments Very pleasant, cooperative, and receptive to education/training   Active ROM Upper Body   Active ROM Upper Body  WDL   Comments Observed during functional activities   Strength Upper Body   Upper Body Strength  WDL   Balance Assessment   Sitting Balance (Static) Fair +   Sitting Balance (Dynamic) Fair +   Standing Balance (Static) Fair   Standing Balance (Dynamic) Fair   Weight Shift Sitting Good   Weight Shift Standing Fair   Comments w/FWW   Bed Mobility    Comments Up to chair pre and post   ADL Assessment   Eating Supervision   Grooming Supervision;Standing  (hand washing at sink)   Lower Body Dressing Supervision  (Following  training on AE to don underwear and manage socks)   Toileting Supervision  (Completed pericare after voiding on standard toilet)   How much help from another person does the patient currently need...   6 Clicks Daily Activity Score 22   Functional Mobility   Sit to Stand Supervised   Bed, Chair, Wheelchair Transfer Supervised   Toilet Transfers Standby Assist  (Use of grab bars; edu on AE/DME available to assist at home (i.e., grab bars, toilet riser with arms, BSC); advised against suction cup)   Mobility EOB <> bathroom; w/FWW   Activity Tolerance   Sitting in Chair Up to chair pre and post   Standing 5 min   Short Term Goals   Short Term Goal # 1 Pt will have no further acute OT needs at [place]   Education Group   Education Provided Energy Conservation;Home Safety;Role of Occupational Therapist;Activities of Daily Living;Adaptive Equipment;Weight Bearing Precautions;Pathology of bedrest   Role of Occupational Therapist Patient Response Patient;Other;Acceptance;Explanation;Verbal Demonstration   Energy Conservation Patient Response Patient;Other;Acceptance;Explanation;Verbal Demonstration  (Pacing activities, gradually incresing intensity over time, and taking rest breaks when needed)   Home Safety Patient Response Other;Acceptance;Explanation;Verbal Demonstration;Patient;* * Learner * *  (Recommend use of shower chair and having supervision when stepping in/out of shower to reduce risk of falls)   ADL Patient Response Patient;Other;Acceptance;Explanation;Verbal Demonstration;Action Demonstration  (Compensatory strategies to safely complete ADLs)   Adaptive Equipment Patient Response Patient;Other;Acceptance;Explanation;Demonstration;Teach Back;Verbal Demonstration;Action Demonstration  (Training on use of AE to complete LB dressing)   Weight Bearing Precautions Patient Response Patient;Other;Acceptance;Explanation;Verbal Demonstration;Action Demonstration   Pathology of Bedrest Patient Response  Patient;Other;Acceptance;Explanation;Verbal Demonstration;Action Demonstration  (Importance of OOB ADLs to reduce risk of deconditioning and assist with recovery)

## 2024-06-30 NOTE — PROGRESS NOTES
[facility] Medicine Daily Progress Note    Date of Service  6/29/2024    Chief Complaint  Tayla Molina is a 84 y.o. female admitted 6/28/2024 with closed right IT fracture.     Hospital Course  Tayla Molina is a 84 y.o. female with RA and hypothyroidism who presented 6/28/2024 with Right intertrochanteric fracture.     She was out in the yard replacing solar lights today when she tripped on the concrete and landed on her Right side. She denies any prodromal symptoms nor loss of consciousness. Her hip pain is currently 5/10. She had recent constipation from keflex but it has resolved with benefiber.     In the ED she presented with normal vital signs. Hip XR and pelvis XR demonstrate a 3-part intertrochanteric fracture. She received 2 mg IV morphine and 4 mg IV zofran. CXR no acute abnormality. EKG NSR. CBC demonstrates normocytc anemia Hgb 10.8. CMP is normal. INR and PTT normal.  Orthopedics Dr. Peng was consulted by EDP and recommends surgery tomorrow with Dr. Velasco. Ok to start LMWH this evening.     I discussed the plan of care with patient, family, bedside RN, and orthopedics and ED provider .       Interval Problem Update  6/29:  up at commode during exam. Minimal pain. NAD, lives with daughter.    I have discussed this patient's plan of care and discharge plan at IDT rounds today with Case Management, Nursing, Nursing leadership, and other members of the IDT team.    Consultants/Specialty  orthopedics    Code Status  Full Code    Disposition    Anticipate discharge to: skilled nursing facility    I have placed the appropriate orders for post-discharge needs.    Review of Systems  Review of Systems   Constitutional:  Negative for chills, diaphoresis, fever and malaise/fatigue.   HENT:  Negative for congestion and sore throat.    Eyes:  Negative for pain and discharge.   Respiratory:  Negative for cough, hemoptysis, sputum production, shortness of breath and wheezing.     Cardiovascular:  Negative for chest pain, palpitations, claudication and leg swelling.   Gastrointestinal:  Negative for abdominal pain, constipation, diarrhea, melena, nausea and vomiting.   Genitourinary:  Negative for dysuria, frequency and urgency.   Musculoskeletal:  Positive for joint pain (right hip). Negative for back pain, myalgias and neck pain.   Skin:  Negative for itching and rash.   Neurological:  Negative for dizziness, sensory change, speech change, focal weakness, loss of consciousness, weakness and headaches.   Endo/Heme/Allergies:  Does not bruise/bleed easily.   Psychiatric/Behavioral:  Negative for depression, substance abuse and suicidal ideas.         Physical Exam  Temp:  [35.8 °C (96.5 °F)-36.5 °C (97.7 °F)] 36.5 °C (97.7 °F)  Pulse:  [65-86] 86  Resp:  [12-20] 16  BP: ()/(47-60) 116/60  SpO2:  [93 %-100 %] 98 %    Physical Exam  Vitals and nursing note reviewed.   Constitutional:       General: She is not in acute distress.     Appearance: Normal appearance. She is well-developed. She is not diaphoretic.   HENT:      Head: Normocephalic and atraumatic.      Nose: Nose normal.      Mouth/Throat:      Mouth: Mucous membranes are moist.      Pharynx: No oropharyngeal exudate.   Eyes:      General: No scleral icterus.        Right eye: No discharge.         Left eye: No discharge.      Conjunctiva/sclera: Conjunctivae normal.      Pupils: Pupils are equal, round, and reactive to light.   Neck:      Thyroid: No thyromegaly.      Vascular: No JVD.      Trachea: No tracheal deviation.   Cardiovascular:      Rate and Rhythm: Normal rate and regular rhythm.      Heart sounds: Normal heart sounds. No murmur heard.     No friction rub. No gallop.   Pulmonary:      Effort: Pulmonary effort is normal. No respiratory distress.      Breath sounds: Normal breath sounds. No stridor. No wheezing or rales.   Chest:      Chest wall: No tenderness.   Abdominal:      General: Bowel sounds are normal.  There is no distension.      Palpations: Abdomen is soft. There is no mass.      Tenderness: There is no abdominal tenderness. There is no guarding or rebound.   Musculoskeletal:         General: Signs of injury (right hip incision lateral thigh CD&I.) present. No tenderness. Normal range of motion.      Cervical back: Normal range of motion and neck supple.   Lymphadenopathy:      Cervical: No cervical adenopathy.   Skin:     General: Skin is warm and dry.      Capillary Refill: Capillary refill takes less than 2 seconds.      Findings: No erythema or rash.   Neurological:      General: No focal deficit present.      Mental Status: She is alert and oriented to person, place, and time.      Cranial Nerves: No cranial nerve deficit.      Motor: No abnormal muscle tone.      Coordination: Coordination normal.   Psychiatric:         Behavior: Behavior normal.         Thought Content: Thought content normal.         Judgment: Judgment normal.         Fluids    Intake/Output Summary (Last 24 hours) at 6/29/2024 2312  Last data filed at 6/29/2024 2130  Gross per 24 hour   Intake 500 ml   Output 950 ml   Net -450 ml       Laboratory  Recent Labs     06/28/24  1545   WBC 6.0   RBC 3.18*   HEMOGLOBIN 10.8*   HEMATOCRIT 31.8*   .0*   MCH 34.0*   MCHC 34.0   RDW 43.4   PLATELETCT 224   MPV 10.3     Recent Labs     06/28/24  1545   SODIUM 140   POTASSIUM 4.2   CHLORIDE 107   CO2 23   GLUCOSE 103*   BUN 25*   CREATININE 0.65   CALCIUM 8.7     Recent Labs     06/28/24  1545   APTT 27.6   INR 1.03               Imaging  DX-CHEST-LIMITED (1 VIEW)   Final Result      No acute cardiac or pulmonary abnormalities are identified.      DX-FEMUR-2+ RIGHT   Final Result      Acute three-part right hip intertrochanteric fracture.      DX-PELVIS-1 OR 2 VIEWS   Final Result      Acute comminuted 3 part right-sided intertrochanteric hip fracture.      DX-PORTABLE FLUOROSCOPY < 1 HOUR    (Results Pending)   DX-HIP-UNILATERAL-W/O  PELVIS-2/3 VIEWS RIGHT    (Results Pending)        Assessment/Plan  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- (present on admission)  Assessment & Plan  Holding alendronate in acute setting  Continue vit D / Ca    Vitamin D deficiency- (present on admission)  Assessment & Plan  Increased calcium / VitD    Primary insomnia- (present on admission)  Assessment & Plan  Continue trazodone QHS    Hypothyroidism due to acquired atrophy of thyroid- (present on admission)  Assessment & Plan  Continue levothyroxine    Rheumatoid arthritis involving multiple sites with positive rheumatoid factor (HCC)- (present on admission)  Assessment & Plan  Continue celecoxib  Holding xeljanz in acute setting         VTE prophylaxis:    enoxaparin ppx      I have performed a physical exam and reviewed and updated ROS and Plan today (6/29/2024). In review of yesterday's note (6/28/2024), there are no changes except as documented above.

## 2024-06-30 NOTE — PROGRESS NOTES
[facility] Medicine Daily Progress Note    Date of Service  6/30/2024    Chief Complaint  Tayla Molina is a 84 y.o. female admitted 6/28/2024 with closed right IT fracture.     Hospital Course  Tayla Molina is a 84 y.o. female with RA and hypothyroidism who presented 6/28/2024 with Right intertrochanteric fracture.     She was out in the yard replacing solar lights today when she tripped on the concrete and landed on her Right side. She denies any prodromal symptoms nor loss of consciousness. Her hip pain is currently 5/10. She had recent constipation from keflex but it has resolved with benefiber.     In the ED she presented with normal vital signs. Hip XR and pelvis XR demonstrate a 3-part intertrochanteric fracture. She received 2 mg IV morphine and 4 mg IV zofran. CXR no acute abnormality. EKG NSR. CBC demonstrates normocytc anemia Hgb 10.8. CMP is normal. INR and PTT normal.  Orthopedics Dr. Peng was consulted by EDP and recommends surgery tomorrow with Dr. Velasco. Ok to start LMWH this evening.     I discussed the plan of care with patient, family, bedside RN, and orthopedics and ED provider .       Interval Problem Update  6/29:  up at commode during exam. Minimal pain. NAD, lives with daughter.  6/30:  worked with PT/OT today, did well, pending notes.  She would like to go home with  PT/OT, but daughter works during the day. Post op incision CD&I.    I have discussed this patient's plan of care and discharge plan at IDT rounds today with Case Management, Nursing, Nursing leadership, and other members of the IDT team.    Consultants/Specialty  orthopedics    Code Status  Full Code    Disposition  The patient is medically cleared for discharge to home or a post-acute facility.  Anticipate discharge to: skilled nursing facility    I have placed the appropriate orders for post-discharge needs.    Review of Systems  Review of Systems   Constitutional:  Negative for chills, diaphoresis,  fever and malaise/fatigue.   HENT:  Negative for congestion and sore throat.    Eyes:  Negative for pain and discharge.   Respiratory:  Negative for cough, hemoptysis, sputum production, shortness of breath and wheezing.    Cardiovascular:  Negative for chest pain, palpitations, claudication and leg swelling.   Gastrointestinal:  Negative for abdominal pain, constipation, diarrhea, melena, nausea and vomiting.   Genitourinary:  Negative for dysuria, frequency and urgency.   Musculoskeletal:  Positive for joint pain (right hip). Negative for back pain, myalgias and neck pain.   Skin:  Negative for itching and rash.   Neurological:  Negative for dizziness, sensory change, speech change, focal weakness, loss of consciousness, weakness and headaches.   Endo/Heme/Allergies:  Does not bruise/bleed easily.   Psychiatric/Behavioral:  Negative for depression, substance abuse and suicidal ideas.         Physical Exam  Temp:  [36.1 °C (97 °F)-36.7 °C (98.1 °F)] 36.5 °C (97.7 °F)  Pulse:  [] 98  Resp:  [15-18] 18  BP: ()/(42-66) 100/66  SpO2:  [90 %-98 %] 94 %    Physical Exam  Vitals and nursing note reviewed.   Constitutional:       General: She is not in acute distress.     Appearance: Normal appearance. She is well-developed. She is not diaphoretic.   HENT:      Head: Normocephalic and atraumatic.      Nose: Nose normal.      Mouth/Throat:      Mouth: Mucous membranes are moist.      Pharynx: No oropharyngeal exudate.   Eyes:      General: No scleral icterus.        Right eye: No discharge.         Left eye: No discharge.      Conjunctiva/sclera: Conjunctivae normal.      Pupils: Pupils are equal, round, and reactive to light.   Neck:      Thyroid: No thyromegaly.      Vascular: No JVD.      Trachea: No tracheal deviation.   Cardiovascular:      Rate and Rhythm: Normal rate and regular rhythm.      Heart sounds: Normal heart sounds. No murmur heard.     No friction rub. No gallop.   Pulmonary:      Effort:  Pulmonary effort is normal. No respiratory distress.      Breath sounds: Normal breath sounds. No stridor. No wheezing or rales.   Chest:      Chest wall: No tenderness.   Abdominal:      General: Bowel sounds are normal. There is no distension.      Palpations: Abdomen is soft. There is no mass.      Tenderness: There is no abdominal tenderness. There is no guarding or rebound.   Musculoskeletal:         General: Signs of injury (right hip incision lateral thigh CD&I.) present. No tenderness. Normal range of motion.      Cervical back: Normal range of motion and neck supple.   Lymphadenopathy:      Cervical: No cervical adenopathy.   Skin:     General: Skin is warm and dry.      Capillary Refill: Capillary refill takes less than 2 seconds.      Findings: No erythema or rash.   Neurological:      General: No focal deficit present.      Mental Status: She is alert and oriented to person, place, and time.      Cranial Nerves: No cranial nerve deficit.      Motor: No abnormal muscle tone.      Coordination: Coordination normal.   Psychiatric:         Behavior: Behavior normal.         Thought Content: Thought content normal.         Judgment: Judgment normal.         Fluids    Intake/Output Summary (Last 24 hours) at 6/30/2024 1630  Last data filed at 6/29/2024 2130  Gross per 24 hour   Intake --   Output 500 ml   Net -500 ml       Laboratory  Recent Labs     06/28/24  1545 06/30/24  1003   WBC 6.0 11.4*   RBC 3.18* 2.58*   HEMOGLOBIN 10.8* 8.7*   HEMATOCRIT 31.8* 26.3*   .0* 101.9*   MCH 34.0* 33.7*   MCHC 34.0 33.1   RDW 43.4 44.7   PLATELETCT 224 173   MPV 10.3 10.0     Recent Labs     06/28/24  1545 06/30/24  1003   SODIUM 140 138   POTASSIUM 4.2 4.2   CHLORIDE 107 105   CO2 23 25   GLUCOSE 103* 149*   BUN 25* 24*   CREATININE 0.65 0.72   CALCIUM 8.7 8.5     Recent Labs     06/28/24  1545   APTT 27.6   INR 1.03               Imaging  DX-CHEST-LIMITED (1 VIEW)   Final Result      No acute cardiac or pulmonary  abnormalities are identified.      DX-FEMUR-2+ RIGHT   Final Result      Acute three-part right hip intertrochanteric fracture.      DX-PELVIS-1 OR 2 VIEWS   Final Result      Acute comminuted 3 part right-sided intertrochanteric hip fracture.      DX-PORTABLE FLUOROSCOPY < 1 HOUR    (Results Pending)   DX-HIP-UNILATERAL-W/O PELVIS-2/3 VIEWS RIGHT    (Results Pending)        Assessment/Plan  * Closed fracture of femur, intertrochanteric, right, initial encounter (HCC)- (present on admission)  Assessment & Plan  Mechanical fall without prodrome  S/p ORIF right IT fracture with intramedullary nailing on 6/29.  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- (present on admission)  Assessment & Plan  Holding alendronate in acute setting  Continue vit D / Ca    Vitamin D deficiency- (present on admission)  Assessment & Plan  Increased calcium / VitD    Primary insomnia- (present on admission)  Assessment & Plan  Continue trazodone QHS    Hypothyroidism due to acquired atrophy of thyroid- (present on admission)  Assessment & Plan  Continue levothyroxine    Rheumatoid arthritis involving multiple sites with positive rheumatoid factor (HCC)- (present on admission)  Assessment & Plan  Continue celecoxib  Holding xeljanz in acute setting         VTE prophylaxis:    enoxaparin ppx      I have performed a physical exam and reviewed and updated ROS and Plan today (6/30/2024). In review of yesterday's note (6/29/2024), there are no changes except as documented above.

## 2024-07-01 VITALS
BODY MASS INDEX: 27.75 KG/M2 | RESPIRATION RATE: 18 BRPM | SYSTOLIC BLOOD PRESSURE: 109 MMHG | WEIGHT: 147 LBS | HEIGHT: 61 IN | DIASTOLIC BLOOD PRESSURE: 52 MMHG | TEMPERATURE: 97.7 F | HEART RATE: 87 BPM | OXYGEN SATURATION: 90 %

## 2024-07-01 PROCEDURE — A9270 NON-COVERED ITEM OR SERVICE: HCPCS | Performed by: STUDENT IN AN ORGANIZED HEALTH CARE EDUCATION/TRAINING PROGRAM

## 2024-07-01 PROCEDURE — 99239 HOSP IP/OBS DSCHRG MGMT >30: CPT | Performed by: HOSPITALIST

## 2024-07-01 PROCEDURE — 700102 HCHG RX REV CODE 250 W/ 637 OVERRIDE(OP): Performed by: STUDENT IN AN ORGANIZED HEALTH CARE EDUCATION/TRAINING PROGRAM

## 2024-07-01 RX ORDER — OXYCODONE HYDROCHLORIDE 5 MG/1
5 TABLET ORAL EVERY 6 HOURS PRN
Qty: 16 TABLET | Refills: 0 | Status: SHIPPED | OUTPATIENT
Start: 2024-07-01 | End: 2024-07-05

## 2024-07-01 RX ORDER — ASPIRIN 81 MG/1
81 TABLET, CHEWABLE ORAL
Qty: 60 TABLET | Refills: 0 | Status: SHIPPED | OUTPATIENT
Start: 2024-07-01 | End: 2024-07-31

## 2024-07-01 RX ADMIN — CELECOXIB 200 MG: 200 CAPSULE ORAL at 04:07

## 2024-07-01 RX ADMIN — CHOLECALCIFEROL TAB 125 MCG (5000 UNIT) 5000 UNITS: 125 TAB at 04:07

## 2024-07-01 RX ADMIN — LEVOTHYROXINE SODIUM 88 MCG: 0.09 TABLET ORAL at 04:07

## 2024-07-01 RX ADMIN — THERA TABS 1 TABLET: TAB at 04:06

## 2024-07-01 RX ADMIN — Medication 400 MG: at 04:06

## 2024-07-01 RX ADMIN — ANTACID TABLETS 500 MG: 500 TABLET, CHEWABLE ORAL at 04:06

## 2024-07-01 RX ADMIN — ACETAMINOPHEN 1000 MG: 500 TABLET ORAL at 04:06

## 2024-07-01 NOTE — DOCUMENTATION QUERY
"                                                                         [facility]                                                                       Query Response Note      PATIENT:               ELBERT ASHLEY  ACCT #:                  [id number]  MRN:                     [id number]  :                      1940  ADMIT DATE:       2024 2:22 PM  DISCH DATE:        2024 11:41 AM  RESPONDING  PROVIDER #:        [id number]           QUERY TEXT:    Anemia is documented in the Medical Record.     Can the type of anemia be further specified?    The patient's Clinical Indicators include:  85yo with dx of osteoporosis with fracture, reposition of upper femur     HGB 10.8   HGB 8.7     H&P \"CBC demonstrates normocytic anemia HGB 10.8\"    Risk factors: advanced age, femur fx, surgical intervention  Treatments: labwork, monitoring    If you agree with the above dx, please document in the progress notes.     Contact me with questions.     Thank you,  Karlee Andersen, MAGDALENAP, CDI  [email or web address]  Options provided:   -- Blood loss anemia, acute   -- Acute on chronic anemia   -- Postoperative blood loss anemia   -- Other explanation, (please specify other explanation)   -- Unable to determine      Query created by: Ganesh April on 2024 5:59 AM    RESPONSE TEXT:    Blood loss anemia, acute          Electronically signed by:  CARLOS LAU MD 2024 4:57 PM              "

## 2024-07-01 NOTE — CARE PLAN
The patient is Stable - Low risk of patient condition declining or worsening    Shift Goals  Clinical Goals: mobility, safety, pain control  Patient Goals: comfort, discharge  Family Goals: not present    Problem: Pain - Standard  Goal: Alleviation of pain or a reduction in pain to the patient’s comfort goal  Outcome: Progressing  Progress made toward(s) clinical / shift goals: pt was well controlled throughout shift, no prn pain meds required     Problem: Knowledge Deficit - Standard  Goal: Patient and family/care givers will demonstrate understanding of plan of care, disease process/condition, diagnostic tests and medications  Outcome: Progressing  Progress made toward(s) clinical / shift goals: pt verbalized understanding of plan of care and education      Problem: Fall Risk  Goal: Patient will remain free from falls  Outcome: Progressing  Progress made toward(s) clinical / shift goals: pt is wearing non slip socks, pt calls appropriately for assistance, fall risk sign in place

## 2024-07-01 NOTE — PROGRESS NOTES
Tayla Molina has been provided discharge instructions, to include follow up care, home medications, and activity/diet reviewed. Understanding verbalized. No IV present. Arm band removed. Pt ride present. Pt out of DCL at this time with personal belongings.

## 2024-07-01 NOTE — DISCHARGE PLANNING
Case Management Discharge Planning    Admission Date: 6/28/2024  GMLOS: 4.5  ALOS: 3    6-Clicks ADL Score: 22  6-Clicks Mobility Score: 18      Anticipated Discharge Dispo: Discharge Disposition: D/T to home under HHA care in anticipation of covered skilled care (06)    DME Needed: No    Action(s) Taken: Updated Provider/Nurse on Discharge Plan    Pt was discussed in IDT rounds with Dr Malhotra.   Plan to discharge Pt to home, MD placed a Home Health order.     Pt has [employer] ,Sent Home Health choice to [facility].    HH choices are [facility], [facility] and [facility]. Will try if Pt will have HH acceptance as most  agencies do not accept [employer].     Per rounds discussion with MD , since there is no accepting Home Health , plan is to provide Pt OP referral for  PT and OT,  Beside RN aware.   Pt s  daughter at bedside to take Pt home.     Escalations Completed: None    Medically Clear: Yes    Next Steps:   CM to continue to assist Pt with discharge as needed    Barriers to Discharge:   None    Is the patient up for discharge tomorrow: No

## 2024-07-01 NOTE — PROGRESS NOTES
Pt in no acute distress no SOB. MD at bedside, questions answered. PIV removed. Prescriptions sent to her pharmacy. Pt left via wheelchair. All belongings with pt. Daughter at bedside.   Discharge lounge order placed.   Home health prescription hand written by MD provided.

## 2024-07-01 NOTE — DISCHARGE INSTRUCTIONS
FOLLOW UP  No future appointments.  Oren Velasco M.D.  [address] [place] [id number] [phone]     Schedule an appointment as soon as possible for a visit in 2 week(s)  For wound re-check, For suture removal      Per rounds discussion with MD , since there is no accepting Home Health , plan is to provide Pt OP referral for  PT and OT

## 2024-07-01 NOTE — THERAPY
"Physical Therapy   Initial Evaluation     Patient Name: Tayla Molina  Age:  84 y.o., Sex:  female  Medical Record #: [id number]  Today's Date: 6/30/2024     Precautions  Precautions: Fall Risk;Weight Bearing As Tolerated Right Lower Extremity    Assessment  Patient is 84 y.o. female s/p Right femur cephalomedullary nail. Right intertrochanteric femur fracture. She was out in the yard replacing solar lights today when she tripped on the concrete and landed on her Right side. Hx of RA, hypothyroidism.    Pt doing very well with mobility with FWW. Pt able to demonstrate up/down steps with bilat rail and with one rail going sideways. Anticipate pt will benefit from home with home health and home with family assistance upon DC from hospital.       Plan    Physical Therapy Initial Treatment Plan   Duration: Evaluation only    DC Equipment Recommendations: None  Discharge Recommendations: Recommend home health for continued physical therapy services       Subjective    Pt resting in bed. Motivate for therapy. \"I'm doing much better today.\"     Objective       06/30/24 1147   Initial Contact Note    Initial Contact Note Order Received and Verified, Physical Therapy Evaluation in Progress with Full Report to Follow.   Precautions   Precautions Fall Risk;Weight Bearing As Tolerated Right Lower Extremity   Vitals   Pulse Oximetry 94 %   O2 Delivery Device None - Room Air   Vitals Comments min SOB with exertion   Pain 0 - 10 Group   Location Hip   Location Orientation Right   Therapist Pain Assessment During Activity;Post Activity;Post Activity Pain Same as Prior to Activity   Prior Living Situation   Prior Services Home-Independent   Housing / Facility 1 Story House   Steps Into Home 3   Steps In Home 0   Bathroom Set up Walk In Shower;Shower Chair   Equipment Owned Front-Wheel Walker;Tub / Shower Seat;Hand Held Shower;Reacher;Other (Comments)  (recliner, bed rail, step stool due to high bed)   Lives with - Patient's " Self Care Capacity Adult Children   Comments Pt currently resides with her daughter who works outside of the house. Can provide assist when home.   History of Falls   History of Falls Yes   Date of Last Fall 06/28/24  (fell in yard)   Cognition    Cognition / Consciousness WDL   Level of Consciousness Alert   Comments Pleasant, cooperative, knowledgeable about rehaba nd mobility from TKA 3 years ago.   Active ROM Lower Body    Active ROM Lower Body  WDL   Strength Lower Body   Lower Body Strength  Not Tested   Comments WFL   Coordination Lower Body    Coordination Lower Body  WDL   Balance Assessment   Sitting Balance (Static) Fair +   Sitting Balance (Dynamic) Fair +   Standing Balance (Static) Fair   Standing Balance (Dynamic) Fair   Weight Shift Sitting Good   Weight Shift Standing Fair   Comments standing with FWW, able to stand without FWW in order to don bathrobe   Bed Mobility    Supine to Sit Supervised   Scooting Supervised   Rolling Supervised   Comments HOB min elevated, bed rail use   Gait Analysis   Gait Level Of Assist Supervised   Assistive Device Front Wheel Walker   Distance (Feet) 60   # of Times Distance was Traveled 2   Deviation Antalgic;Bradykinetic  (no LOB)   # of Stairs Climbed 8  (bilat Rail, right rail amb sideways)   Level of Assist with Stairs Standby Assist   Comments VCs for sequencing steps   Functional Mobility   Sit to Stand Supervised   Bed, Chair, Wheelchair Transfer Supervised   Transfer Method Stand Step   Mobility with FWW   6 Clicks Assessment - How much HELP from from another person do you currently need... (If the patient hasn't done an activity recently, how much help from another person do you think he/she would need if he/she tried?)   Turning from your back to your side while in a flat bed without using bedrails? 3   Moving from lying on your back to sitting on the side of a flat bed without using bedrails? 3   Moving to and from a bed to a chair (including a  wheelchair)? 3   Standing up from a chair using your arms (e.g., wheelchair, or bedside chair)? 3   Walking in hospital room? 3   Climbing 3-5 steps with a railing? 3   6 clicks Mobility Score 18   Activity Tolerance   Sitting in Chair in chair post session   Sitting Edge of Bed 2 minutes   Standing 10 minutes total   Comments limted by pain and min SOB   Education Group   Education Provided Role of Physical Therapist;Gait Training;Stair Training;Use of Assistive Device;Weight Bearing Status   Role of Physical Therapist Patient Response Patient;Acceptance;Explanation;Verbal Demonstration   Gait Training Patient Response Patient;Acceptance;Explanation;Action Demonstration   Stair Training Patient Response Patient;Acceptance;Explanation;Action Demonstration;Demonstration   Use of Assistive Device Patient Response Patient;Acceptance;Explanation;Verbal Demonstration;Action Demonstration;Demonstration   Weight Bearing Status Patient Response Patient;Acceptance;Demonstration;Verbal Demonstration;Action Demonstration;Explanation   Physical Therapy Initial Treatment Plan    Duration Evaluation only   Anticipated Discharge Equipment and Recommendations   DC Equipment Recommendations None   Discharge Recommendations Recommend home health for continued physical therapy services   Interdisciplinary Plan of Care Collaboration   IDT Collaboration with  Nursing   Patient Position at End of Therapy Seated;Call Light within Reach;Tray Table within Reach;Phone within Reach;Family / Friend in Room   Collaboration Comments RN updated

## 2024-07-01 NOTE — DISCHARGE PLANNING
1143  Agency/Facility Name: [facility]  Spoke To: Alysha  Outcome: DPA called regarding pending referral. Alysha requested it be faxed to [phone]  Referral manually faxed to requested number.

## 2024-07-01 NOTE — CARE PLAN
The patient is Stable - Low risk of patient condition declining or worsening    Shift Goals  Clinical Goals: PT/OT, safety, pain management  Patient Goals: comfort  Family Goals: not present    Progress made toward(s) clinical / shift goals:  yes    Problem: Pain - Standard  Goal: Alleviation of pain or a reduction in pain to the patient’s comfort goal  Outcome: Progressing     Problem: Knowledge Deficit - Standard  Goal: Patient and family/care givers will demonstrate understanding of plan of care, disease process/condition, diagnostic tests and medications  Outcome: Progressing     Problem: Skin Integrity  Goal: Skin integrity is maintained or improved  Outcome: Progressing     Problem: Fall Risk  Goal: Patient will remain free from falls  Outcome: Progressing     Problem: Respiratory  Goal: Patient will achieve/maintain optimum respiratory ventilation and gas exchange  Outcome: Progressing     Problem: Mobility  Goal: Patient's capacity to carry out activities will improve  Outcome: Progressing  Flowsheets (Taken 6/30/2024 1722)  Mobility:   Encouraged mobilization per interdisciplinary team recommendations   Monitored for signs of activity intolerance   Provided assistive devices   Provided rest periods between activities   Administered pain management to allow progressive mobilization   Collaborated with PT/OT     Problem: Infection - Standard  Goal: Patient will remain free from infection  Outcome: Progressing  Flowsheets (Taken 6/30/2024 0274)  Standard Infection Interventions:   Assessed for signs and symptoms of infection   Implemented standard precautions   Instructed patient/family on signs and symptoms of infection   Provided education on proper hand hygiene and infection prevention measures   Assessed for removal IV, central lines, intra-arterial or urinary catheters     Problem: Wound/ / Incision Healing  Goal: Patient's wound/surgical incision will decrease in size and heals properly  Outcome:  Progressing

## (undated) DEVICE — SENSOR SPO2 NEO LNCS ADHESIVE (20/BX) SEE USER NOTES

## (undated) DEVICE — PACK TOTAL KNEE  (1/CA)

## (undated) DEVICE — SYS BN CMNT HI VAC KT MXR BWL - (MIX-E-VAC II)  (10EA/CA)

## (undated) DEVICE — HEAD HOLDER JUNIOR/ADULT

## (undated) DEVICE — TIP INTPLS HFLO ML ORFC BTRY - (12/CS)  FOR SURGILAV

## (undated) DEVICE — TOWELS CLOTH SURGICAL - (4/PK 20PK/CA)

## (undated) DEVICE — LENS/HOOD FOR SPACESUIT - (32/PK) PEEL AWAY FACE

## (undated) DEVICE — GLOVE BIOGEL INDICATOR SZ 8 SURGICAL PF LTX - (50/BX 4BX/CA)

## (undated) DEVICE — STAPLER SKIN DISP - (6/BX 10BX/CA) VISISTAT

## (undated) DEVICE — SENSOR OXIMETER ADULT SPO2 RD SET (20EA/BX)

## (undated) DEVICE — CUFF 30 X 4 TOURNIQUET 2 PORT DISPOSABLE STERILE (10EA/BX)

## (undated) DEVICE — ELECTRODE 850 FOAM ADHESIVE - HYDROGEL RADIOTRNSPRNT (50/PK)

## (undated) DEVICE — BAG SPONGE COUNT 10.25 X 32 - BLUE (250/CA)

## (undated) DEVICE — SODIUM CHL IRRIGATION 0.9% 1000ML (12EA/CA)

## (undated) DEVICE — SUTURE GENERAL

## (undated) DEVICE — SODIUM CHL. IRRIGATION 0.9% 3000ML (4EA/CA 65CA/PF)

## (undated) DEVICE — DRAPE 36X28IN RAD CARM BND BG - (25/CA) O

## (undated) DEVICE — PINS

## (undated) DEVICE — KIT ANESTHESIA W/CIRCUIT & 3/LT BAG W/FILTER (20EA/CA)

## (undated) DEVICE — PACK MAJOR ORTHO - (2EA/CA)

## (undated) DEVICE — PIN

## (undated) DEVICE — SLEEVE, VASO, THIGH, MED

## (undated) DEVICE — GLOVE BIOGEL PI INDICATOR SZ 6.5 SURGICAL PF LF - (50/BX 4BX/CA)

## (undated) DEVICE — CANISTER SUCTION 3000ML MECHANICAL FILTER AUTO SHUTOFF MEDI-VAC NONSTERILE LF DISP  (40EA/CA)

## (undated) DEVICE — NEPTUNE 4 PORT MANIFOLD - (20/PK)

## (undated) DEVICE — STOCKINETTE IMPERVIOUS 12X48 - STERILELF (10/CA)"

## (undated) DEVICE — LACTATED RINGERS INJ 1000 ML - (14EA/CA 60CA/PF)

## (undated) DEVICE — LOCKING DRILL 4.2X360MM

## (undated) DEVICE — SUTURE 1 VICRYL PLUS CTX - 8 X 18 INCH (12/BX)

## (undated) DEVICE — GLOVE BIOGEL SZ 7.5 SURGICAL PF LTX - (50PR/BX 4BX/CA)

## (undated) DEVICE — SET EXTENSION WITH 2 PORTS (48EA/CA) ***PART #2C8610 IS A SUBSTITUTE*****

## (undated) DEVICE — DRAPE LARGE 3 QUARTER - (20/CA)

## (undated) DEVICE — SUCTION INSTRUMENT YANKAUER BULBOUS TIP W/O VENT (50EA/CA)

## (undated) DEVICE — DRESSING TRANSPARENT FILM TEGADERM 4 X 4.75" (50EA/BX)"

## (undated) DEVICE — MASK ANESTHESIA ADULT  - (100/CA)

## (undated) DEVICE — Device

## (undated) DEVICE — SUTURE 2-0 VICRYL PLUS CT-1 - 8 X 18 INCH(12/BX)

## (undated) DEVICE — CANISTER SUCTION RIGID RED 1500CC (40EA/CA)

## (undated) DEVICE — DRESSING AQUACEL AG ADVANTAGE 3.5 X 10" (10EA/BX)"

## (undated) DEVICE — NEEDLE W/FACET TIP DULL VERSION W/STIMULATION CABLE SONOPLEX 21G X 4 (10/EA)"

## (undated) DEVICE — GLOVE BIOGEL SZ 8 SURGICAL PF LTX - (50PR/BX 4BX/CA)

## (undated) DEVICE — TUBING CLEARLINK DUO-VENT - C-FLO (48EA/CA)

## (undated) DEVICE — BANDAGE ELASTIC STERILE VELCRO 6 X 5 YDS (25EA/CA)

## (undated) DEVICE — ELECTRODE DUAL RETURN W/ CORD - (50/PK)

## (undated) DEVICE — GLOVE BIOGEL PI ULTRATOUCH SZ 7.5 SURGICAL PF LF -(50/BX 4BX/CA)

## (undated) DEVICE — TUBE CONNECTING SUCTION - CLEAR PLASTIC STERILE 72 IN (50EA/CA)

## (undated) DEVICE — WATER IRRIGATION STERILE 1000ML (12EA/CA)

## (undated) DEVICE — HANDPIECE 10FT INTPLS SCT PLS IRRIGATION HAND CONTROL SET (6/PK)

## (undated) DEVICE — GLOVE BIOGEL PI ORTHO SZ 8 PF LF (40PR/BX)

## (undated) DEVICE — SUCTION INSTRUMENT YANKAUER OPEN TIP W/O VENT (50EA/CA)

## (undated) DEVICE — DRAPE C ARMOR (12EA/CA)

## (undated) DEVICE — GOWN WARMING STANDARD FLEX - (30/CA)

## (undated) DEVICE — GLOVE, LITE (PAIR)

## (undated) DEVICE — GLOVE BIOGEL SZ 6.5 SURGICAL PF LTX (50PR/BX 4BX/CA)

## (undated) DEVICE — BLADE SAGITTAL SAW DUAL CUT 25.0 X 90.0 X 1.27MM (1/EA)

## (undated) DEVICE — HUMID-VENT HEAT AND MOISTURE EXCHANGE- (50/BX)

## (undated) DEVICE — PENCIL ELECTSURG 10FT BTN SWH - (50/CA)

## (undated) DEVICE — SET LEADWIRE 5 LEAD BEDSIDE DISPOSABLE ECG (1SET OF 5/EA)

## (undated) DEVICE — PROTECTOR ULNA NERVE - (36PR/CA)

## (undated) DEVICE — K-WIRE 3.2X450MM (STERILE)

## (undated) DEVICE — GLOVE BIOGEL PI INDICATOR SZ 8.0 SURGICAL PF LF -(50/BX 4BX/CA)

## (undated) DEVICE — CHLORAPREP 26 ML APPLICATOR - ORANGE TINT(25/CA)